# Patient Record
Sex: FEMALE | Race: WHITE | NOT HISPANIC OR LATINO | Employment: FULL TIME | URBAN - METROPOLITAN AREA
[De-identification: names, ages, dates, MRNs, and addresses within clinical notes are randomized per-mention and may not be internally consistent; named-entity substitution may affect disease eponyms.]

---

## 2017-06-08 ENCOUNTER — HOSPITAL ENCOUNTER (OUTPATIENT)
Facility: HOSPITAL | Age: 22
Setting detail: OBSERVATION
Discharge: HOME | End: 2017-06-08
Attending: OBSTETRICS & GYNECOLOGY | Admitting: OBSTETRICS & GYNECOLOGY

## 2017-06-08 ENCOUNTER — HOSPITAL ENCOUNTER (EMERGENCY)
Facility: HOSPITAL | Age: 22
Discharge: STILL ADMITTED OR ADMINISTRATIVE | End: 2017-06-08

## 2017-06-08 VITALS
RESPIRATION RATE: 16 BRPM | DIASTOLIC BLOOD PRESSURE: 62 MMHG | BODY MASS INDEX: 27.97 KG/M2 | OXYGEN SATURATION: 99 % | TEMPERATURE: 99 F | HEART RATE: 84 BPM | WEIGHT: 174 LBS | HEIGHT: 66 IN | SYSTOLIC BLOOD PRESSURE: 115 MMHG

## 2017-06-08 PROBLEM — Z34.90 PREGNANCY: Status: ACTIVE | Noted: 2017-06-08

## 2017-06-08 PROCEDURE — 59025 FETAL NON-STRESS TEST: CPT

## 2017-06-08 PROCEDURE — G0378 HOSPITAL OBSERVATION PER HR: HCPCS

## 2017-06-08 RX ORDER — PRENATAL VIT NO.126/IRON/FOLIC 28MG-0.8MG
TABLET ORAL DAILY
COMMUNITY
End: 2021-07-15

## 2017-06-08 NOTE — NON STRESS TEST
Carlyn Cali, a  at 29w4d with an BLANCA of 2017, by Other Basis, was seen at [facility]OR DELIV[facility] for a nonstress test.    Chief Complaint   Patient presents with   • Abdominal Cramping      at 29 weeks presents for lower abdominal cramping, started onmonday and has been on and off since then, rates as a 6/10, + fetal movement, denies lof and vaginal bleeding       Interpretation A  Nonstress Test Interpretation A: Reactive (17 [id number] : Susie Montemayor RN)

## 2017-07-17 ENCOUNTER — HOSPITAL ENCOUNTER (OUTPATIENT)
Facility: HOSPITAL | Age: 22
Setting detail: OBSERVATION
Discharge: HOME | End: 2017-07-17
Attending: OBSTETRICS & GYNECOLOGY | Admitting: OBSTETRICS & GYNECOLOGY

## 2017-07-17 VITALS
OXYGEN SATURATION: 97 % | BODY MASS INDEX: 30.37 KG/M2 | DIASTOLIC BLOOD PRESSURE: 54 MMHG | SYSTOLIC BLOOD PRESSURE: 112 MMHG | RESPIRATION RATE: 16 BRPM | HEART RATE: 84 BPM | TEMPERATURE: 98.8 F | WEIGHT: 189 LBS | HEIGHT: 66 IN

## 2017-07-17 PROBLEM — O99.891 BACK PAIN AFFECTING PREGNANCY IN THIRD TRIMESTER: Status: ACTIVE | Noted: 2017-07-17

## 2017-07-17 PROBLEM — R10.9 ABDOMINAL PAIN AFFECTING PREGNANCY, ANTEPARTUM: Status: ACTIVE | Noted: 2017-07-17

## 2017-07-17 PROBLEM — O26.899 ABDOMINAL PAIN AFFECTING PREGNANCY, ANTEPARTUM: Status: ACTIVE | Noted: 2017-07-17

## 2017-07-17 PROBLEM — M54.9 BACK PAIN AFFECTING PREGNANCY IN THIRD TRIMESTER: Status: ACTIVE | Noted: 2017-07-17

## 2017-07-17 LAB
ALBUMIN SERPL-MCNC: 3.5 G/DL (ref 3.5–5.2)
ALBUMIN/GLOB SERPL: 1 G/DL
ALP SERPL-CCNC: 159 U/L (ref 39–117)
ALT SERPL W P-5'-P-CCNC: 17 U/L (ref 1–33)
ANION GAP SERPL CALCULATED.3IONS-SCNC: 12.6 MMOL/L
AST SERPL-CCNC: 17 U/L (ref 1–32)
BACTERIA UR QL AUTO: ABNORMAL /HPF
BASOPHILS # BLD AUTO: 0.02 10*3/MM3 (ref 0–0.2)
BASOPHILS NFR BLD AUTO: 0.2 % (ref 0–1.5)
BILIRUB SERPL-MCNC: 0.3 MG/DL (ref 0.1–1.2)
BILIRUB UR QL STRIP: NEGATIVE
BUN BLD-MCNC: 6 MG/DL (ref 6–20)
BUN/CREAT SERPL: 15.4 (ref 7–25)
CALCIUM SPEC-SCNC: 8.9 MG/DL (ref 8.6–10.5)
CHLORIDE SERPL-SCNC: 104 MMOL/L (ref 98–107)
CLARITY UR: ABNORMAL
CO2 SERPL-SCNC: 22.4 MMOL/L (ref 22–29)
COLOR UR: YELLOW
CREAT BLD-MCNC: 0.39 MG/DL (ref 0.57–1)
DEPRECATED RDW RBC AUTO: 43 FL (ref 37–54)
EOSINOPHIL # BLD AUTO: 0.05 10*3/MM3 (ref 0–0.7)
EOSINOPHIL NFR BLD AUTO: 0.5 % (ref 0.3–6.2)
ERYTHROCYTE [DISTWIDTH] IN BLOOD BY AUTOMATED COUNT: 13.5 % (ref 11.7–13)
GFR SERPL CREATININE-BSD FRML MDRD: >150 ML/MIN/1.73
GLOBULIN UR ELPH-MCNC: 3.6 GM/DL
GLUCOSE BLD-MCNC: 101 MG/DL (ref 65–99)
GLUCOSE UR STRIP-MCNC: NEGATIVE MG/DL
HCT VFR BLD AUTO: 30.5 % (ref 35.6–45.5)
HGB BLD-MCNC: 10 G/DL (ref 11.9–15.5)
HGB UR QL STRIP.AUTO: NEGATIVE
HYALINE CASTS UR QL AUTO: ABNORMAL /LPF
IMM GRANULOCYTES # BLD: 0.15 10*3/MM3 (ref 0–0.03)
IMM GRANULOCYTES NFR BLD: 1.6 % (ref 0–0.5)
KETONES UR QL STRIP: NEGATIVE
LEUKOCYTE ESTERASE UR QL STRIP.AUTO: ABNORMAL
LYMPHOCYTES # BLD AUTO: 2.39 10*3/MM3 (ref 0.9–4.8)
LYMPHOCYTES NFR BLD AUTO: 24.9 % (ref 19.6–45.3)
MCH RBC QN AUTO: 28.4 PG (ref 26.9–32)
MCHC RBC AUTO-ENTMCNC: 32.8 G/DL (ref 32.4–36.3)
MCV RBC AUTO: 86.6 FL (ref 80.5–98.2)
MONOCYTES # BLD AUTO: 0.85 10*3/MM3 (ref 0.2–1.2)
MONOCYTES NFR BLD AUTO: 8.9 % (ref 5–12)
NEUTROPHILS # BLD AUTO: 6.12 10*3/MM3 (ref 1.9–8.1)
NEUTROPHILS NFR BLD AUTO: 63.9 % (ref 42.7–76)
NITRITE UR QL STRIP: NEGATIVE
PH UR STRIP.AUTO: 6.5 [PH] (ref 5–8)
PLATELET # BLD AUTO: 194 10*3/MM3 (ref 140–500)
PMV BLD AUTO: 11.9 FL (ref 6–12)
POTASSIUM BLD-SCNC: 3.5 MMOL/L (ref 3.5–5.2)
PROT SERPL-MCNC: 7.1 G/DL (ref 6–8.5)
PROT UR QL STRIP: NEGATIVE
RBC # BLD AUTO: 3.52 10*6/MM3 (ref 3.9–5.2)
RBC # UR: ABNORMAL /HPF
REF LAB TEST METHOD: ABNORMAL
SODIUM BLD-SCNC: 139 MMOL/L (ref 136–145)
SP GR UR STRIP: 1.01 (ref 1–1.03)
SQUAMOUS #/AREA URNS HPF: ABNORMAL /HPF
UROBILINOGEN UR QL STRIP: ABNORMAL
WBC NRBC COR # BLD: 9.58 10*3/MM3 (ref 4.5–10.7)
WBC UR QL AUTO: ABNORMAL /HPF

## 2017-07-17 PROCEDURE — 80053 COMPREHEN METABOLIC PANEL: CPT | Performed by: OBSTETRICS & GYNECOLOGY

## 2017-07-17 PROCEDURE — 87186 SC STD MICRODIL/AGAR DIL: CPT | Performed by: OBSTETRICS & GYNECOLOGY

## 2017-07-17 PROCEDURE — 59025 FETAL NON-STRESS TEST: CPT | Performed by: OBSTETRICS & GYNECOLOGY

## 2017-07-17 PROCEDURE — 99203 OFFICE O/P NEW LOW 30 MIN: CPT | Performed by: OBSTETRICS & GYNECOLOGY

## 2017-07-17 PROCEDURE — 59025 FETAL NON-STRESS TEST: CPT

## 2017-07-17 PROCEDURE — G0378 HOSPITAL OBSERVATION PER HR: HCPCS

## 2017-07-17 PROCEDURE — 85025 COMPLETE CBC W/AUTO DIFF WBC: CPT | Performed by: OBSTETRICS & GYNECOLOGY

## 2017-07-17 PROCEDURE — 81001 URINALYSIS AUTO W/SCOPE: CPT | Performed by: OBSTETRICS & GYNECOLOGY

## 2017-07-17 PROCEDURE — 87086 URINE CULTURE/COLONY COUNT: CPT | Performed by: OBSTETRICS & GYNECOLOGY

## 2017-07-17 NOTE — NURSING NOTE
Patient provided discharge instructions, and verbalizes understanding. Aware to return to triage for changes or concerns. Encouraged to keep prenatal appointments. Patient appears comfortable and ambulated off unit.

## 2017-07-17 NOTE — H&P
[facility]  Obstetric History and Physical    Chief Complaint   Patient presents with   • Contractions     Pt states that she has been having abdominal pain and back pain for three days. Pt sees Dr. Amira Jansen in [place] and plans to delivery there.       Subjective     Patient is a 21 y.o. female  currently at 35w1d, who presents with 3 day h/o lower abdominal pain, back pain and occasional contractions (a few per hour). Has been working 10 hour days on her feet. Prenatal care in [place], [place], but recently moved here.    Her prenatal care is benign.  Her previous obstetric/gynecological history is noted for is non-contributory.    The following portions of the patients history were reviewed and updated as appropriate: current medications, allergies, past medical history, past surgical history, past family history, past social history and problem list .       Prenatal Information:  Prenatal Results         1st Trimester Ref. Range Date Time   CBC with auto diff       Rubella IgG       Hepatitis B SAg       RPR       ABO       Rh       Anibody Screen       HIV       Varicella IgG       Urinalysis with microscopy       Urine Culture       GC/Chlamydia/TV       ThinPrep/Pap       2nd and 3rd Trimester Ref. Range Date Time   Hemoglobin / Hematocrit  30.5 % (L) 35.6 - 45.5 % 17 1702   Hemoglobin  10.0 g/dL (L) 11.9 - 15.5 g/dL 17 1702   Group B Strep Culture       Glucose Challenge Test 1 Hr       Glucose Fasting       Glucose 1 Hr       Glucose 2 Hr       Glucose 3 Hr       Pre-eclampsia Panel       Risk Screening Ref. Range Date Time   Fetal Fibronectin       Amnisure       Hepatitis C Antibody       Hemoglobin electrophoresis       Cystic Fibrosis       Hemoglobin A1C       MSAFP - 4       NIPT       AFP       Parvovirus IgG       Parvovirus IgM       POCT - glucose       Hieu-Sac       24 Hour urine - Total protein       24 Hour urine - Creatinine clearance       Urinalysis with microscopy        Urine Culture       Drug Screening Ref. Range Date Time   Amphetamine Screen       Barbiturate Screen       Benzodiazepine Screen       Methadone Screen       Phencyclidine Screen       Opiates Screen       THC Screen       Cocaine Screen       Propoxyphene Screen       Buprenorphine Screen       Methamphetamine Screen       Oxycodone Screen       Tryicyclic Antidepressants Screen              Legend: ^: Historical            View all results for this pregnancy             Past OB History:     Obstetric History       T0      TAB0   SAB0   E0   M0   L0       # Outcome Date GA Lbr Jaron/2nd Weight Sex Delivery Anes PTL Lv   1 Current                   Past Medical History: History reviewed. No pertinent past medical history.   Past Surgical History Past Surgical History:   Procedure Laterality Date   • TONSILLECTOMY        Family History: History reviewed. No pertinent family history.   Social History:  reports that she has never smoked. She has never used smokeless tobacco.   reports that she does not drink alcohol.   reports that she does not use illicit drugs.        General ROS: The following systems were reviewed and negative;  constitution, eyes, ENT, cardiovascular, genitourinary, integument, hematologic / lymphatic, neurological, behavioral/psych, endocrine and allergies / immunologic    Objective       Vital Signs Range for the last 24 hours  Temperature: Temp:  [98.8 °F (37.1 °C)] 98.8 °F (37.1 °C)   Temp Source: Temp src: Oral   BP: BP: (111-152)/(66-80) 127/72   Pulse: Heart Rate:  [82-98] 86   Respirations: Resp:  [16] 16   SPO2: SpO2:  [97 %] 97 %   O2 Amount (l/min):     O2 Devices O2 Device: room air   Weight: Weight:  [189 lb (85.7 kg)] 189 lb (85.7 kg)     Physical Examination: General appearance - alert, well appearing, and in no distress  Mental status - alert, oriented to person, place, and time  Abdomen - soft, nontender, gravid  Back exam - full range of motion, no tenderness,  palpable spasm or pain on motion, no CVA tenderness  Neurological - alert, oriented, normal speech, no focal findings or movement disorder noted  Musculoskeletal - no joint tenderness, deformity or swelling  Extremities - peripheral pulses normal, no pedal edema, no clubbing or cyanosis  Skin - normal coloration and turgor, no rashes, no suspicious skin lesions noted    Presentation:    Cervix: Exam by: Method: sterile exam per RN   Dilation: Dilation: 0   Effacement: Cervical Effacement: 40%   Station: Station: -3    NST reactive, category 1 tracing w/ rare contractions    Fetal Heart Rate Assessment   Method: Fetal HR Assessment Method: external   Beats/min: Fetal HR (Beats/Min): 130   Baseline: Fetal HR Baseline: normal range (110-160 bpm)   Varibility: Fetal HR Variability: moderate (amplitude range 6 to 25 bpm)   Accels: Fetal HR Accelerations: greater than/equal to 15 bpm, lasting at least 15 seconds   Decels: Fetal HR Decelerations: absent   Tracing Category:       Uterine Assessment   Method: Method: TOCO (external toco transducer)   Frequency (min): Contraction Frequency (min): x1   Ctx Count in 10 min:     Duration: Contraction Duration (sec): 40   Intensity: Contraction Intensity: mild by palpation   Intensity by IUPC:     Resting Tone: Uterine Resting Tone: soft by palpation   Resting Tone by IUPC:     [place] Units:       Laboratory Results:   Radiology Review:   Other Studies:     Assessment/Plan     Active Problems:    Pregnancy    Abdominal pain affecting pregnancy, antepartum    Back pain affecting pregnancy in third trimester        Assessment/Plan  1.  Intrauterine pregnancy at 35w1d weeks gestation with reassuring fetal status.    2.  Mild contractions- recommend pt start maternity leave so can rest, and recommend increased water intake  3. CBC,CMP and UA ordered, if ok, will send home  4. Offered apt w/ our practice this week w/ US growth if desires to transfer care locally and deliver at  [facility]  5. Abdominal pain/back pain- may be r/t contractions, musculoskeletal strain or advancing pregnancy, may consider PT referral if pain continues when off work      Tena Bess MD  7/17/2017  6:00 PM

## 2017-07-17 NOTE — NON STRESS TEST
Carlyn Cali, a  at 35w1d with an BLANCA of 2017, by Other Basis, was seen at [facility] for a nonstress test.    Chief Complaint   Patient presents with   • Contractions     Pt states that she has been having abdominal pain and back pain for three days. Pt sees Dr. Amira Jansen in [place] and plans to delivery there.       Interpretation A  Nonstress Test Interpretation A: Reactive (17 0430 : Susie Montemayor RN)

## 2017-07-20 LAB — BACTERIA SPEC AEROBE CULT: ABNORMAL

## 2020-11-05 LAB
EXTERNAL HEPATITIS B SURFACE ANTIGEN: NEGATIVE
EXTERNAL HEPATITIS C AB: NORMAL
EXTERNAL RUBELLA QUALITATIVE: NORMAL
EXTERNAL SYPHILIS RPR SCREEN: NORMAL
HIV1 P24 AG SERPL QL IA: NORMAL

## 2021-01-29 ENCOUNTER — TELEPHONE (OUTPATIENT)
Dept: OBSTETRICS AND GYNECOLOGY | Facility: CLINIC | Age: 26
End: 2021-01-29

## 2021-01-29 NOTE — TELEPHONE ENCOUNTER
[facility].  She is approx 18 wks gestation and wishes to transfer to our office.  Would you be willing to take on pt's care?    Pt # [phone]

## 2021-01-29 NOTE — TELEPHONE ENCOUNTER
L/m for pt to call to schedule New Ob appt w/Dr. Leyva.  Approx 18 wks, transfer of care from [facility].  Records filed to chart.     Pt # [phone]

## 2021-02-25 ENCOUNTER — INITIAL PRENATAL (OUTPATIENT)
Dept: OBSTETRICS AND GYNECOLOGY | Facility: CLINIC | Age: 26
End: 2021-02-25

## 2021-02-25 VITALS — WEIGHT: 179 LBS | BODY MASS INDEX: 28.89 KG/M2 | DIASTOLIC BLOOD PRESSURE: 70 MMHG | SYSTOLIC BLOOD PRESSURE: 114 MMHG

## 2021-02-25 DIAGNOSIS — Z98.891 HISTORY OF C-SECTION: ICD-10-CM

## 2021-02-25 DIAGNOSIS — O23.40 URINARY TRACT INFECTION IN MOTHER DURING PREGNANCY, ANTEPARTUM: ICD-10-CM

## 2021-02-25 DIAGNOSIS — Z3A.22 22 WEEKS GESTATION OF PREGNANCY: Primary | ICD-10-CM

## 2021-02-25 LAB
GLUCOSE UR STRIP-MCNC: NEGATIVE MG/DL
PROT UR STRIP-MCNC: NEGATIVE MG/DL

## 2021-02-25 PROCEDURE — 99204 OFFICE O/P NEW MOD 45 MIN: CPT | Performed by: STUDENT IN AN ORGANIZED HEALTH CARE EDUCATION/TRAINING PROGRAM

## 2021-02-25 RX ORDER — NITROFURANTOIN 25; 75 MG/1; MG/1
100 CAPSULE ORAL 2 TIMES DAILY
Qty: 14 CAPSULE | Refills: 0 | Status: SHIPPED | OUTPATIENT
Start: 2021-02-25 | End: 2021-03-04

## 2021-02-25 NOTE — PROGRESS NOTES
Initial OB Visit    Chief Complaint   Patient presents with   • Initial Prenatal Visit        Carlyn Cali is being seen today for her first obstetrical visit.  She is a 25 y.o.   at 22w6d by LMP with BLANCA 21. She has been receiving care at [facility] and is transferring care to [place]. She reports having a normal anatomy scan last month.     FOB: Elia Blankenship   This is not a planned pregnancy.   OB History    Para Term  AB Living   2 1 1     1   SAB TAB Ectopic Molar Multiple Live Births                    # Outcome Date GA Lbr Jaron/2nd Weight Sex Delivery Anes PTL Lv   2 Current            1 Term 17 40w2d  4224 g (9 lb 5 oz) F CS-LTranv          Current obstetric complaints: She reports that she is still having nausea and vomiting but able to tolerate a regular diet. She denies vaginal bleeding, leakage of fluid, or contractions. She reports active fetal movement.   Prior obstetric issues, potential pregnancy concerns: Denies history of gestational diabetes, gestational hypertension, postpartum hemorrhage.    G1- Primary low transverse  section at 39w4d for fetal macrosomia   G2- Current   Family history of genetic issues (includes FOB): denies   Prior infections concerning in pregnancy (Rash, fever since LMP): denies   Varicella Hx:Reports having been vaccinated.   Prior genetic testing: low risk cell free DNA  History of abnormal pap smears: denies; last pap smear: normal per patient in 2021  History of STIs: Trichomonas- treated before pregnancy. History of HSV in self or partner? Denies   Prepregnancy weight: 170 lbs     Past Medical History:   Diagnosis Date   • Anemia        Past Surgical History:   Procedure Laterality Date   •  SECTION     • TONSILLECTOMY           Current Outpatient Medications:   •  Ferrous Sulfate (IRON PO), Take  by mouth., Disp: , Rfl:   •  Prenatal Vit-Fe Fumarate-FA (PRENATAL, CLASSIC, VITAMIN) 28-0.8 MG  tablet tablet, Take  by mouth Daily., Disp: , Rfl:   •  nitrofurantoin, macrocrystal-monohydrate, (Macrobid) 100 MG capsule, Take 1 capsule by mouth 2 (Two) Times a Day for 7 days., Disp: 14 capsule, Rfl: 0    No Known Allergies    Social History     Socioeconomic History   • Marital status: Single     Spouse name: Not on file   • Number of children: Not on file   • Years of education: Not on file   • Highest education level: Not on file   Tobacco Use   • Smoking status: Never Smoker   • Smokeless tobacco: Never Used   Substance and Sexual Activity   • Alcohol use: No   • Drug use: No   • Sexual activity: Yes     Partners: Male       History reviewed. No pertinent family history.    Review of systems     Constitutional: negative for chills, fevers and for fatigue  Eyes: negative  Ears, nose, mouth, throat, and face: negative for hearing loss and nasal congestion  Respiratory: negative for asthma and wheezing  Cardiovascular: negative for chest pain and dyspnea  Gastrointestinal: negative for dyspepsia, dysphagia abdominal pain  Genitourinary:negative for urinary incontinence  Integument/breast: negative for breast lump  Hematologic/lymphatic: negative for bleeding  Musculoskeletal:negative for aches  Neurological: negative for numbness/tingling  Behavioral/Psych: negative for anhedonia  Allergic/Immunologic: negative for rash, allergy     Objective    /70   Wt 81.2 kg (179 lb)   LMP 09/18/2020   BMI 28.89 kg/m²       General Appearance:    Alert, cooperative, in no acute distress   Head:    Normocephalic, without obvious abnormality, atraumatic   Eyes:            Lids and lashes normal, conjunctivae and sclerae normal, no   icterus, no pallor, corneas clear   Ears:    Ears appear intact with no abnormalities noted   Neck:   No adenopathy, supple, trachea midline   Back:     No kyphosis present, no scoliosis present                      Lungs:     No increased work of breathing, regular respirations      Heart:    Regular rhythm and normal rate   Breast Exam:    No masses, No nipple discharge   Abdomen:    Gravid, non-tender, soft    Genitalia:    Vulva - BUS-WNL, NEFG    Vagina - normal vaginal mucosa    Cervix -  closed     Uterus - Consistent with 24 weeks    Adnexa - No masses, NT     Extremities:   Moves all extremities well, no edema, no cyanosis, no              redness   Pulses:   Pulses palpable and equal bilaterally   Skin:   No bleeding, bruising or rash   Lymph nodes:   No palpable adenopathy   Neurologic:   Sensation intact, A&O times 3      OB labs: 20- MBT O positive, ABS negative, CBC 7.7\12.7/297, Rubella Immune, HIV NR, Hepatitis B Negative, Hepatitis C negative, RPR NR, Gonorrhea neg, Chlamydia neg, Trichomonas positive, UDS negative, Cell free DNA low risk     Assessment  1. Pregnancy at 22w6d   2. Transfer of care  3. UTI   4. History of trichomonas in pregnancy, negative URIEL  5. History of UTI in pregnancy in first trimester   6. History of C/S x 1 for fetal macrosomia, desires repeat     Plan  Reviewed prenatal record from [facility] and obtained previous operative report to confirm low transverse  section.   Will plan for fetal growth ultrasound between 32-36 weeks given history of fetal macrosomia in previous pregnancy.   Given positive nitrites on UA today, will treat patient for UTI with macrobid 100 mg BID x 7 days and send urine culture.   Will obtain CBC, Glucola at next visit.   Patient is on Prenatal vitamins.  Problem list reviewed and updated.  Reviewed routine prenatal care with the office to include but not limited to:   Reviewed nature of practice and hospital.  Reviewed recommended follow up, importance of compliance with care. We reviewed testing in pregnancy including HIV testing and urine drug screen.    Counseled on limitations of ultrasound in pregnancy in detecting aneuploidy/fetal anomalies.     We reviewed that at this time, her BMI is classified as BMI  25.0-29.9        Classification: overweight.  We reviewed that in pregnancy, her recommended weight gain is 15-25 lbs. In the second and third trimester, the increased demand is approximately 350 and 450 calories respectively.    All questions answered.   Return in about 4 weeks (around 3/25/2021) for prenatal visit .      Pauly Leyva MD

## 2021-02-26 PROBLEM — A59.9 TRICHOMONAS VAGINALIS INFECTION: Status: ACTIVE | Noted: 2021-02-26

## 2021-02-26 PROBLEM — Z98.891 HISTORY OF CESAREAN DELIVERY: Status: ACTIVE | Noted: 2021-02-26

## 2021-02-26 PROBLEM — O23.40 UTI (URINARY TRACT INFECTION) DURING PREGNANCY: Status: ACTIVE | Noted: 2021-02-26

## 2021-02-28 LAB
BACTERIA UR CULT: ABNORMAL
BACTERIA UR CULT: ABNORMAL
OTHER ANTIBIOTIC SUSC ISLT: ABNORMAL

## 2021-03-01 ENCOUNTER — TELEPHONE (OUTPATIENT)
Dept: OBSTETRICS AND GYNECOLOGY | Facility: CLINIC | Age: 26
End: 2021-03-01

## 2021-03-01 NOTE — TELEPHONE ENCOUNTER
Called patient regarding positive urine culture result for E.coli UTI. Patient indicated understanding and is taking Macrobid for treatment following last appointment. Advised that this should treat and she is to contact the office with infection concerns. All questions answered.     Pauly Leyva MD  3/1/2021  12:52 EST

## 2021-03-03 ENCOUNTER — TELEPHONE (OUTPATIENT)
Dept: OBSTETRICS AND GYNECOLOGY | Facility: CLINIC | Age: 26
End: 2021-03-03

## 2021-03-03 NOTE — TELEPHONE ENCOUNTER
Good Morning,   Patient called and called into work because her ankles were swollen and and she had gotten sick. She said she stands for 10 hours a day and called in today. She wanted to know if she could get a work note?   Please Advise,  Thanks Jessika

## 2021-04-01 ENCOUNTER — ROUTINE PRENATAL (OUTPATIENT)
Dept: OBSTETRICS AND GYNECOLOGY | Facility: CLINIC | Age: 26
End: 2021-04-01

## 2021-04-01 VITALS — WEIGHT: 187 LBS | BODY MASS INDEX: 30.18 KG/M2 | SYSTOLIC BLOOD PRESSURE: 131 MMHG | DIASTOLIC BLOOD PRESSURE: 77 MMHG

## 2021-04-01 DIAGNOSIS — R39.89 POSSIBLE URINARY TRACT INFECTION: ICD-10-CM

## 2021-04-01 DIAGNOSIS — Z3A.27 27 WEEKS GESTATION OF PREGNANCY: Primary | ICD-10-CM

## 2021-04-01 DIAGNOSIS — Z98.891 HISTORY OF C-SECTION: ICD-10-CM

## 2021-04-01 LAB
GLUCOSE UR STRIP-MCNC: NEGATIVE MG/DL
PROT UR STRIP-MCNC: NEGATIVE MG/DL

## 2021-04-01 PROCEDURE — 99213 OFFICE O/P EST LOW 20 MIN: CPT | Performed by: STUDENT IN AN ORGANIZED HEALTH CARE EDUCATION/TRAINING PROGRAM

## 2021-04-01 NOTE — PROGRESS NOTES
Chief Complaint   Patient presents with   • Routine Prenatal Visit      Carlyn Cali is a 25 y.o.  at 27w6d who presents for routine prenatal visit. She has no complaints today and states that she is just tired. She denies vaginal bleeding, cramping, contractions, LOF. She has active fetal movement. She denies fever, chills, dysuria or back pain.     /77   Wt 84.8 kg (187 lb)   LMP 2020   BMI 30.18 kg/m²    Gen: well appearing, NAd  Abd: gravid, nontender  Back: no CVA tenderness   See OB Flowsheet    ASSESSMENT:   1. IUP at 27w6d   2. Possible UTI, h/o UTI   3. History of C/S x 1 for fetal macrosomia     PLAN:  Problem list reviewed and updated.  Repeat urine culture collected today with concerns for UTI on UA. Will prescribe keflex 500 mg QID x 7 days for treatment of UTI. Reviewed previous urine culture which was E. Coli and was pan-sensitive.   We discussed TDaP vaccination and the patient would like to consider at next visit.   Second trimester precautions reviewed including  labor precautions, anticipated fetal movements.   Return in about 2 weeks (around 4/15/2021) for prenatal visit .    Patient Active Problem List    Diagnosis Date Noted   • UTI (urinary tract infection) during pregnancy 2021   • Trichomonas vaginalis infection 2021   • History of  delivery 2021   • Abdominal pain affecting pregnancy, antepartum 2017   • Back pain affecting pregnancy in third trimester 2017   • Pregnancy 2017       Orders Placed This Encounter   Procedures   • Urine Culture - Urine, Urine, Clean Catch   • POC Urinalysis Dipstick     This is an external result entered through the Results Console.     Pauly Leyva MD

## 2021-04-02 ENCOUNTER — TELEPHONE (OUTPATIENT)
Dept: OBSTETRICS AND GYNECOLOGY | Facility: CLINIC | Age: 26
End: 2021-04-02

## 2021-04-02 RX ORDER — CEPHALEXIN 500 MG/1
500 CAPSULE ORAL 4 TIMES DAILY
Qty: 28 CAPSULE | Refills: 0 | Status: SHIPPED | OUTPATIENT
Start: 2021-04-02 | End: 2021-04-09

## 2021-04-02 NOTE — TELEPHONE ENCOUNTER
Pt states she is returning a call from today.  No msg's in chart.  Do you have her urine culture results, or other information to relay?    Pt # [phone]

## 2021-04-04 LAB
BACTERIA UR CULT: ABNORMAL
BACTERIA UR CULT: ABNORMAL
OTHER ANTIBIOTIC SUSC ISLT: ABNORMAL

## 2021-04-06 ENCOUNTER — TELEPHONE (OUTPATIENT)
Dept: OBSTETRICS AND GYNECOLOGY | Facility: CLINIC | Age: 26
End: 2021-04-06

## 2021-04-06 NOTE — TELEPHONE ENCOUNTER
Patient is aware of results and has no additional questions.      Original message from provider - Please call and let her know that she passed her diabetes screening and her blood counts were normal. Thanks!

## 2021-04-29 ENCOUNTER — ROUTINE PRENATAL (OUTPATIENT)
Dept: OBSTETRICS AND GYNECOLOGY | Facility: CLINIC | Age: 26
End: 2021-04-29

## 2021-04-29 VITALS — BODY MASS INDEX: 30.99 KG/M2 | WEIGHT: 192 LBS | SYSTOLIC BLOOD PRESSURE: 132 MMHG | DIASTOLIC BLOOD PRESSURE: 72 MMHG

## 2021-04-29 DIAGNOSIS — Z3A.31 31 WEEKS GESTATION OF PREGNANCY: Primary | ICD-10-CM

## 2021-04-29 DIAGNOSIS — Z87.440 HISTORY OF UTI: ICD-10-CM

## 2021-04-29 DIAGNOSIS — Z30.09 UNWANTED FERTILITY: ICD-10-CM

## 2021-04-29 DIAGNOSIS — Z34.90 PREGNANCY, UNSPECIFIED GESTATIONAL AGE: ICD-10-CM

## 2021-04-29 DIAGNOSIS — O26.843 UTERINE SIZE DATE DISCREPANCY PREGNANCY, THIRD TRIMESTER: ICD-10-CM

## 2021-04-29 DIAGNOSIS — Z98.891 HISTORY OF CESAREAN DELIVERY: ICD-10-CM

## 2021-04-29 PROCEDURE — 0502F SUBSEQUENT PRENATAL CARE: CPT | Performed by: STUDENT IN AN ORGANIZED HEALTH CARE EDUCATION/TRAINING PROGRAM

## 2021-04-29 NOTE — PROGRESS NOTES
Chief Complaint   Patient presents with   • Routine Prenatal Visit      Carlyn Cali is a 25 y.o.  at 31w6d who presents for routine prenatal visit. She reports doing well since her last visit just feels more uncomfortable and tired. Denies vaginal bleeding, cramping, contractions, LOF. Reports active fetal movement. Denies dysuria, fever, chills, back pain. Patient reports that she would like her tubes tied at the time of her c/s.     /72   Wt 87.1 kg (192 lb)   LMP 2020   BMI 30.99 kg/m²    Gen: well appearing, NAD   Abd: gravid, nontender  Back: no CVA tenderness   See OB Flowsheet    ASSESSMENT:   1. IUP at 31w6d   2. History of UTI on 21   3. History of C/s x 1, for fetal macrosomia   4. Unwanted fertility   5. Uterine size less than dates     PLAN:  Problem list reviewed and updated.   Patient desires repeat  section and would like a tubal ligation. Counseled on risks/benefits of bilateral tubal ligation.  She was counseled that this should be considered a permanent procedure.  Although there are surgeries to reverse this, there not 100% successful.  She is adamant that she desires no further childbearing.  We discussed that bilateral tubal ligations usually do not change menstrual cycles, so some women are on hormonal therapy to control bleeding or regularly periods.  We discussed that it also does not prevent sexually transmitted infections and so condoms are recommended to prevent transmission of these diseases.  We discussed other options for contraception including LARCs.  Patient was counseled that there is a risk of failure and if the tubal ligation were to fail, there is an increased risk of ectopic pregnancy.  She was counseled that she missed a period she should take a pregnancy test and if positive be seen by a physician immediately.  We reviewed the risk of regret and desire for future childbearing.  We discussed that this risk is higher in women who have fewer  children or or at a younger age.  Patient had all questions answered at the end of this discussion. Will plan to perform at time of  section. Plan for  section on 21 at 39w0d.   Patient to provide urine sample tomorrow for test of cure following UTI at previous visit for which she completed treatment.   Will obtain growth ultrasound at next visit given uterine size less than dates.  Third trimester precautions reviewed including labor signs, monitoring fetal movements.  Return in about 2 weeks (around 2021) for prenatal visit and growth ultrasound .    Patient Active Problem List    Diagnosis Date Noted   • UTI (urinary tract infection) during pregnancy 2021   • Trichomonas vaginalis infection 2021   • History of  delivery 2021   • Abdominal pain affecting pregnancy, antepartum 2017   • Back pain affecting pregnancy in third trimester 2017   • Pregnancy 2017       Orders Placed This Encounter   Procedures   • Urine Culture - Urine, Urine, Clean Catch     Order Specific Question:   Release to patient     Answer:   Immediate   • US ob follow up transabdominal approach     Standing Status:   Future     Standing Expiration Date:   2022     Order Specific Question:   Reason for Exam:     Answer:   size less than dates     Order Specific Question:   Release to patient     Answer:   Immediate     Pauly Leyva MD

## 2021-04-30 DIAGNOSIS — Z87.440 HISTORY OF UTI: Primary | ICD-10-CM

## 2021-04-30 PROBLEM — Z30.09 UNWANTED FERTILITY: Status: ACTIVE | Noted: 2021-04-30

## 2021-05-13 ENCOUNTER — ROUTINE PRENATAL (OUTPATIENT)
Dept: OBSTETRICS AND GYNECOLOGY | Facility: CLINIC | Age: 26
End: 2021-05-13

## 2021-05-13 VITALS — DIASTOLIC BLOOD PRESSURE: 63 MMHG | BODY MASS INDEX: 31.15 KG/M2 | WEIGHT: 193 LBS | SYSTOLIC BLOOD PRESSURE: 116 MMHG

## 2021-05-13 DIAGNOSIS — Z30.09 UNWANTED FERTILITY: ICD-10-CM

## 2021-05-13 DIAGNOSIS — Z3A.33 33 WEEKS GESTATION OF PREGNANCY: Primary | ICD-10-CM

## 2021-05-13 DIAGNOSIS — O23.43 URINARY TRACT INFECTION IN MOTHER DURING THIRD TRIMESTER OF PREGNANCY: ICD-10-CM

## 2021-05-13 DIAGNOSIS — Z98.891 HISTORY OF C-SECTION: ICD-10-CM

## 2021-05-13 LAB
GLUCOSE UR STRIP-MCNC: NEGATIVE MG/DL
PROT UR STRIP-MCNC: NEGATIVE MG/DL

## 2021-05-13 PROCEDURE — 0502F SUBSEQUENT PRENATAL CARE: CPT | Performed by: STUDENT IN AN ORGANIZED HEALTH CARE EDUCATION/TRAINING PROGRAM

## 2021-05-13 RX ORDER — NITROFURANTOIN 25; 75 MG/1; MG/1
100 CAPSULE ORAL 2 TIMES DAILY
Qty: 40 CAPSULE | Refills: 1 | Status: SHIPPED | OUTPATIENT
Start: 2021-05-13 | End: 2021-05-20

## 2021-05-16 LAB
BACTERIA UR CULT: ABNORMAL
BACTERIA UR CULT: ABNORMAL
OTHER ANTIBIOTIC SUSC ISLT: ABNORMAL

## 2021-05-21 ENCOUNTER — TELEPHONE (OUTPATIENT)
Dept: OBSTETRICS AND GYNECOLOGY | Facility: CLINIC | Age: 26
End: 2021-05-21

## 2021-05-21 NOTE — TELEPHONE ENCOUNTER
Contacted patient about test results. Patient is aware and has no additional questions.        Original message from provider - Please call and let her know that her urine culture did show that she still had an E.coli urinary tract infection and the antibiotics that she is taking should help clear her infection. Thanks!

## 2021-05-28 ENCOUNTER — ROUTINE PRENATAL (OUTPATIENT)
Dept: OBSTETRICS AND GYNECOLOGY | Facility: CLINIC | Age: 26
End: 2021-05-28

## 2021-05-28 VITALS — SYSTOLIC BLOOD PRESSURE: 130 MMHG | DIASTOLIC BLOOD PRESSURE: 70 MMHG | WEIGHT: 198 LBS | BODY MASS INDEX: 31.96 KG/M2

## 2021-05-28 DIAGNOSIS — Z34.80 SUPERVISION OF OTHER NORMAL PREGNANCY, ANTEPARTUM: ICD-10-CM

## 2021-05-28 DIAGNOSIS — Z98.891 HISTORY OF C-SECTION: ICD-10-CM

## 2021-05-28 DIAGNOSIS — Z30.09 UNWANTED FERTILITY: ICD-10-CM

## 2021-05-28 DIAGNOSIS — Z87.440 HISTORY OF UTI: ICD-10-CM

## 2021-05-28 DIAGNOSIS — Z3A.36 36 WEEKS GESTATION OF PREGNANCY: Primary | ICD-10-CM

## 2021-05-28 PROCEDURE — 0502F SUBSEQUENT PRENATAL CARE: CPT | Performed by: STUDENT IN AN ORGANIZED HEALTH CARE EDUCATION/TRAINING PROGRAM

## 2021-05-30 LAB
BACTERIA UR CULT: NORMAL
BACTERIA UR CULT: NORMAL

## 2021-06-01 LAB — B-HEM STREP SPEC QL CULT: NEGATIVE

## 2021-06-04 ENCOUNTER — ROUTINE PRENATAL (OUTPATIENT)
Dept: OBSTETRICS AND GYNECOLOGY | Facility: CLINIC | Age: 26
End: 2021-06-04

## 2021-06-04 VITALS — SYSTOLIC BLOOD PRESSURE: 122 MMHG | DIASTOLIC BLOOD PRESSURE: 76 MMHG | BODY MASS INDEX: 31.96 KG/M2 | WEIGHT: 198 LBS

## 2021-06-04 DIAGNOSIS — Z3A.37 37 WEEKS GESTATION OF PREGNANCY: Primary | ICD-10-CM

## 2021-06-04 DIAGNOSIS — R42 LIGHT HEADEDNESS: ICD-10-CM

## 2021-06-04 DIAGNOSIS — Z98.891 HISTORY OF C-SECTION: ICD-10-CM

## 2021-06-04 DIAGNOSIS — Z30.09 UNWANTED FERTILITY: ICD-10-CM

## 2021-06-04 DIAGNOSIS — Z87.440 HISTORY OF UTI: ICD-10-CM

## 2021-06-04 LAB
GLUCOSE UR STRIP-MCNC: NEGATIVE MG/DL
PROT UR STRIP-MCNC: ABNORMAL MG/DL

## 2021-06-04 PROCEDURE — 59426 ANTEPARTUM CARE ONLY: CPT | Performed by: STUDENT IN AN ORGANIZED HEALTH CARE EDUCATION/TRAINING PROGRAM

## 2021-06-04 NOTE — PROGRESS NOTES
Chief Complaint   Patient presents with   • Routine Prenatal Visit      Carlyn Cali is a 25 y.o.  at 37w0d who presents for routine prenatal visit. She reports feeling some what light headed and attributes it to anemia. She is taking one tablet of iron daily. Denies vaginal bleeding, cramping, contractions, LOF. She reports active fetal movement. She is compliant with suppression therapy for recurrent UTIs. She denies fever, chills, back pain, abdominal pain or dysuria.     /76   Wt 89.8 kg (198 lb)   LMP 2020   BMI 31.96 kg/m²    Gen: well appearing, NAD   Abd: gravid, nontender  Back: no CVA tenderness   SVE: 0/0/-1   See OB Flowsheet    ASSESSMENT:   1. IUP at 37w0d   2. History of recurrent UTIs- on suppression with Macrobid   3. History of C/s x 1  4. Unwanted fertility  5. Light headedness     PLAN:  Problem list reviewed and updated.   Scheduled for repeat  section and tubal ligation on 21.   Reviewed negative test of cure for UTI during last visit. Continue suppression therapy with macrobid nightly.   We discussed increasing Fe supplementation from daily to BID and decreasing if she becomes constipated. Patient advised to contact office for worsening light headedness that I suspect is related to pregnancy.   Third trimester precautions reviewed including labor signs, monitoring fetal movements.   Return in about 1 week (around 2021) for prenatal visit with Carlyn Flood, NP. .    Patient Active Problem List    Diagnosis Date Noted   • Unwanted fertility 2021     Note Last Updated: 2021     Added automatically from request for surgery [id number]     • UTI (urinary tract infection) during pregnancy 2021   • Trichomonas vaginalis infection 2021   • History of  delivery 2021   • Abdominal pain affecting pregnancy, antepartum 2017   • Back pain affecting pregnancy in third trimester 2017   • Pregnancy 2017       Orders  Placed This Encounter   Procedures   • POC Urinalysis Dipstick     This is an external result entered through the Results Console.     Order Specific Question:   Release to patient     Answer:   Immediate     Pauly Leyva MD

## 2021-06-11 ENCOUNTER — ROUTINE PRENATAL (OUTPATIENT)
Dept: OBSTETRICS AND GYNECOLOGY | Facility: CLINIC | Age: 26
End: 2021-06-11

## 2021-06-11 VITALS — DIASTOLIC BLOOD PRESSURE: 73 MMHG | BODY MASS INDEX: 32.28 KG/M2 | WEIGHT: 200 LBS | SYSTOLIC BLOOD PRESSURE: 131 MMHG

## 2021-06-11 DIAGNOSIS — Z34.83 ENCOUNTER FOR SUPERVISION OF OTHER NORMAL PREGNANCY IN THIRD TRIMESTER: Primary | ICD-10-CM

## 2021-06-11 DIAGNOSIS — O34.219 PREVIOUS CESAREAN DELIVERY, ANTEPARTUM: ICD-10-CM

## 2021-06-11 LAB
GLUCOSE UR STRIP-MCNC: NEGATIVE MG/DL
PROT UR STRIP-MCNC: NEGATIVE MG/DL

## 2021-06-11 PROCEDURE — 0502F SUBSEQUENT PRENATAL CARE: CPT | Performed by: OBSTETRICS & GYNECOLOGY

## 2021-06-11 RX ORDER — NITROFURANTOIN 25; 75 MG/1; MG/1
100 CAPSULE ORAL 2 TIMES DAILY
COMMUNITY
End: 2021-06-21 | Stop reason: HOSPADM

## 2021-06-11 NOTE — PROGRESS NOTES
Ob follow up    Carlyn Cali is a 25 y.o.  38w0d patient being seen today for her obstetrical visit. Patient reports no complaints. Fetal movement: normal.    Her prenatal care is complicated by (and status) : Prior        ROS -   Fetal Movement good   Vaginal bleeding none   Cramping/Contractions none      /73   Wt 90.7 kg (200 lb)   LMP 2020   BMI 32.28 kg/m²     FHT:  134 BPM    Uterine Size: 35 cm   Presentations: cephalic   Pelvic Exam:     Dilation: Closed    Effacement: Long    Station:  -2                 Assessment    There are no diagnoses linked to this encounter.    1) Pregnancy at 38w0d  2) Fetal status reassuring   3) GBS status - negative   4) Prior , repeat at 39 weeks next Friday with Dr. Leyva     Plan    Labor warnings   [facility] BID        Rodolfo Andre MD   2021  12:18 EDT

## 2021-06-18 ENCOUNTER — ANESTHESIA EVENT (OUTPATIENT)
Dept: LABOR AND DELIVERY | Facility: HOSPITAL | Age: 26
End: 2021-06-18

## 2021-06-18 ENCOUNTER — ANESTHESIA (OUTPATIENT)
Dept: LABOR AND DELIVERY | Facility: HOSPITAL | Age: 26
End: 2021-06-18

## 2021-06-18 ENCOUNTER — HOSPITAL ENCOUNTER (INPATIENT)
Facility: HOSPITAL | Age: 26
LOS: 3 days | Discharge: HOME | End: 2021-06-21
Attending: STUDENT IN AN ORGANIZED HEALTH CARE EDUCATION/TRAINING PROGRAM | Admitting: STUDENT IN AN ORGANIZED HEALTH CARE EDUCATION/TRAINING PROGRAM

## 2021-06-18 DIAGNOSIS — Z30.09 UNWANTED FERTILITY: ICD-10-CM

## 2021-06-18 DIAGNOSIS — Z34.90 PREGNANCY, UNSPECIFIED GESTATIONAL AGE: ICD-10-CM

## 2021-06-18 DIAGNOSIS — Z98.891 S/P CESAREAN SECTION: Primary | ICD-10-CM

## 2021-06-18 DIAGNOSIS — Z98.891 HISTORY OF CESAREAN DELIVERY: ICD-10-CM

## 2021-06-18 LAB
ABO GROUP BLD: NORMAL
BLD GP AB SCN SERPL QL: NEGATIVE
DEPRECATED RDW RBC AUTO: 40.9 FL (ref 37–54)
ERYTHROCYTE [DISTWIDTH] IN BLOOD BY AUTOMATED COUNT: 13.1 % (ref 12.3–15.4)
GLUCOSE BLDC GLUCOMTR-MCNC: 94 MG/DL (ref 70–130)
HCT VFR BLD AUTO: 33.8 % (ref 34–46.6)
HGB BLD-MCNC: 11.3 G/DL (ref 12–15.9)
MCH RBC QN AUTO: 29.2 PG (ref 26.6–33)
MCHC RBC AUTO-ENTMCNC: 33.4 G/DL (ref 31.5–35.7)
MCV RBC AUTO: 87.3 FL (ref 79–97)
PLATELET # BLD AUTO: 191 10*3/MM3 (ref 140–450)
PMV BLD AUTO: 11.8 FL (ref 6–12)
RBC # BLD AUTO: 3.87 10*6/MM3 (ref 3.77–5.28)
RH BLD: POSITIVE
SARS-COV-2 RNA PNL SPEC NAA+PROBE: NOT DETECTED
T&S EXPIRATION DATE: NORMAL
WBC # BLD AUTO: 9.41 10*3/MM3 (ref 3.4–10.8)

## 2021-06-18 PROCEDURE — 59515 CESAREAN DELIVERY: CPT | Performed by: STUDENT IN AN ORGANIZED HEALTH CARE EDUCATION/TRAINING PROGRAM

## 2021-06-18 PROCEDURE — 25010000002 DROPERIDOL PER 5 MG: Performed by: ANESTHESIOLOGY

## 2021-06-18 PROCEDURE — 25010000002 PHENYLEPHRINE PER 1 ML: Performed by: REGISTERED NURSE

## 2021-06-18 PROCEDURE — 58611 LIGATE OVIDUCT(S) ADD-ON: CPT | Performed by: STUDENT IN AN ORGANIZED HEALTH CARE EDUCATION/TRAINING PROGRAM

## 2021-06-18 PROCEDURE — 59514 CESAREAN DELIVERY ONLY: CPT | Performed by: OBSTETRICS & GYNECOLOGY

## 2021-06-18 PROCEDURE — 88302 TISSUE EXAM BY PATHOLOGIST: CPT | Performed by: STUDENT IN AN ORGANIZED HEALTH CARE EDUCATION/TRAINING PROGRAM

## 2021-06-18 PROCEDURE — 87635 SARS-COV-2 COVID-19 AMP PRB: CPT | Performed by: STUDENT IN AN ORGANIZED HEALTH CARE EDUCATION/TRAINING PROGRAM

## 2021-06-18 PROCEDURE — 25010000003 CEFAZOLIN IN DEXTROSE 2-4 GM/100ML-% SOLUTION: Performed by: STUDENT IN AN ORGANIZED HEALTH CARE EDUCATION/TRAINING PROGRAM

## 2021-06-18 PROCEDURE — S0260 H&P FOR SURGERY: HCPCS | Performed by: STUDENT IN AN ORGANIZED HEALTH CARE EDUCATION/TRAINING PROGRAM

## 2021-06-18 PROCEDURE — 58611 LIGATE OVIDUCT(S) ADD-ON: CPT | Performed by: OBSTETRICS & GYNECOLOGY

## 2021-06-18 PROCEDURE — 86901 BLOOD TYPING SEROLOGIC RH(D): CPT | Performed by: STUDENT IN AN ORGANIZED HEALTH CARE EDUCATION/TRAINING PROGRAM

## 2021-06-18 PROCEDURE — 25010000002 FENTANYL CITRATE (PF) 50 MCG/ML SOLUTION: Performed by: ANESTHESIOLOGY

## 2021-06-18 PROCEDURE — 25010000002 ONDANSETRON PER 1 MG: Performed by: REGISTERED NURSE

## 2021-06-18 PROCEDURE — 82962 GLUCOSE BLOOD TEST: CPT

## 2021-06-18 PROCEDURE — 86850 RBC ANTIBODY SCREEN: CPT | Performed by: STUDENT IN AN ORGANIZED HEALTH CARE EDUCATION/TRAINING PROGRAM

## 2021-06-18 PROCEDURE — 0UB70ZZ EXCISION OF BILATERAL FALLOPIAN TUBES, OPEN APPROACH: ICD-10-PCS | Performed by: STUDENT IN AN ORGANIZED HEALTH CARE EDUCATION/TRAINING PROGRAM

## 2021-06-18 PROCEDURE — 25010000002 ONDANSETRON PER 1 MG: Performed by: ANESTHESIOLOGY

## 2021-06-18 PROCEDURE — 25010000002 MORPHINE PER 10 MG: Performed by: ANESTHESIOLOGY

## 2021-06-18 PROCEDURE — 25010000002 PROMETHAZINE PER 50 MG: Performed by: ANESTHESIOLOGY

## 2021-06-18 PROCEDURE — 85027 COMPLETE CBC AUTOMATED: CPT | Performed by: STUDENT IN AN ORGANIZED HEALTH CARE EDUCATION/TRAINING PROGRAM

## 2021-06-18 PROCEDURE — 25010000002 KETOROLAC TROMETHAMINE PER 15 MG: Performed by: REGISTERED NURSE

## 2021-06-18 PROCEDURE — 86900 BLOOD TYPING SEROLOGIC ABO: CPT | Performed by: STUDENT IN AN ORGANIZED HEALTH CARE EDUCATION/TRAINING PROGRAM

## 2021-06-18 PROCEDURE — 25010000002 KETOROLAC TROMETHAMINE PER 15 MG: Performed by: STUDENT IN AN ORGANIZED HEALTH CARE EDUCATION/TRAINING PROGRAM

## 2021-06-18 RX ORDER — OXYTOCIN-SODIUM CHLORIDE 0.9% IV SOLN 30 UNIT/500ML 30-0.9/5 UT/ML-%
999 SOLUTION INTRAVENOUS ONCE
Status: COMPLETED | OUTPATIENT
Start: 2021-06-18 | End: 2021-06-18

## 2021-06-18 RX ORDER — PHYTONADIONE 1 MG/.5ML
INJECTION, EMULSION INTRAMUSCULAR; INTRAVENOUS; SUBCUTANEOUS
Status: DISPENSED
Start: 2021-06-18 | End: 2021-06-18

## 2021-06-18 RX ORDER — ACETAMINOPHEN 500 MG
1000 TABLET ORAL ONCE
Status: COMPLETED | OUTPATIENT
Start: 2021-06-18 | End: 2021-06-18

## 2021-06-18 RX ORDER — ONDANSETRON 2 MG/ML
4 INJECTION INTRAMUSCULAR; INTRAVENOUS ONCE AS NEEDED
Status: COMPLETED | OUTPATIENT
Start: 2021-06-18 | End: 2021-06-18

## 2021-06-18 RX ORDER — ONDANSETRON 4 MG/1
4 TABLET, FILM COATED ORAL EVERY 8 HOURS PRN
Status: DISCONTINUED | OUTPATIENT
Start: 2021-06-18 | End: 2021-06-21 | Stop reason: HOSPADM

## 2021-06-18 RX ORDER — DOCUSATE SODIUM 100 MG/1
100 CAPSULE, LIQUID FILLED ORAL 2 TIMES DAILY PRN
Status: DISCONTINUED | OUTPATIENT
Start: 2021-06-18 | End: 2021-06-21 | Stop reason: HOSPADM

## 2021-06-18 RX ORDER — ERYTHROMYCIN 5 MG/G
OINTMENT OPHTHALMIC
Status: DISPENSED
Start: 2021-06-18 | End: 2021-06-18

## 2021-06-18 RX ORDER — KETOROLAC TROMETHAMINE 30 MG/ML
30 INJECTION, SOLUTION INTRAMUSCULAR; INTRAVENOUS EVERY 6 HOURS
Status: COMPLETED | OUTPATIENT
Start: 2021-06-18 | End: 2021-06-19

## 2021-06-18 RX ORDER — SODIUM CHLORIDE 0.9 % (FLUSH) 0.9 %
3 SYRINGE (ML) INJECTION EVERY 12 HOURS SCHEDULED
Status: DISCONTINUED | OUTPATIENT
Start: 2021-06-18 | End: 2021-06-18 | Stop reason: HOSPADM

## 2021-06-18 RX ORDER — METHYLERGONOVINE MALEATE 0.2 MG/ML
200 INJECTION INTRAVENOUS ONCE AS NEEDED
Status: DISCONTINUED | OUTPATIENT
Start: 2021-06-18 | End: 2021-06-18 | Stop reason: HOSPADM

## 2021-06-18 RX ORDER — KETOROLAC TROMETHAMINE 30 MG/ML
INJECTION, SOLUTION INTRAMUSCULAR; INTRAVENOUS AS NEEDED
Status: DISCONTINUED | OUTPATIENT
Start: 2021-06-18 | End: 2021-06-18 | Stop reason: SURG

## 2021-06-18 RX ORDER — LIDOCAINE HYDROCHLORIDE 10 MG/ML
5 INJECTION, SOLUTION EPIDURAL; INFILTRATION; INTRACAUDAL; PERINEURAL AS NEEDED
Status: DISCONTINUED | OUTPATIENT
Start: 2021-06-18 | End: 2021-06-18 | Stop reason: HOSPADM

## 2021-06-18 RX ORDER — DROPERIDOL 2.5 MG/ML
0.62 INJECTION, SOLUTION INTRAMUSCULAR; INTRAVENOUS ONCE
Status: COMPLETED | OUTPATIENT
Start: 2021-06-18 | End: 2021-06-18

## 2021-06-18 RX ORDER — CARBOPROST TROMETHAMINE 250 UG/ML
250 INJECTION, SOLUTION INTRAMUSCULAR AS NEEDED
Status: DISCONTINUED | OUTPATIENT
Start: 2021-06-18 | End: 2021-06-18 | Stop reason: HOSPADM

## 2021-06-18 RX ORDER — OXYCODONE AND ACETAMINOPHEN 10; 325 MG/1; MG/1
1 TABLET ORAL EVERY 4 HOURS PRN
Status: DISCONTINUED | OUTPATIENT
Start: 2021-06-18 | End: 2021-06-21 | Stop reason: HOSPADM

## 2021-06-18 RX ORDER — SODIUM CHLORIDE 0.9 % (FLUSH) 0.9 %
10 SYRINGE (ML) INJECTION AS NEEDED
Status: DISCONTINUED | OUTPATIENT
Start: 2021-06-18 | End: 2021-06-18 | Stop reason: HOSPADM

## 2021-06-18 RX ORDER — ONDANSETRON 2 MG/ML
INJECTION INTRAMUSCULAR; INTRAVENOUS AS NEEDED
Status: DISCONTINUED | OUTPATIENT
Start: 2021-06-18 | End: 2021-06-18 | Stop reason: SURG

## 2021-06-18 RX ORDER — FAMOTIDINE 10 MG/ML
20 INJECTION, SOLUTION INTRAVENOUS ONCE AS NEEDED
Status: COMPLETED | OUTPATIENT
Start: 2021-06-18 | End: 2021-06-18

## 2021-06-18 RX ORDER — SODIUM CHLORIDE, SODIUM LACTATE, POTASSIUM CHLORIDE, CALCIUM CHLORIDE 600; 310; 30; 20 MG/100ML; MG/100ML; MG/100ML; MG/100ML
125 INJECTION, SOLUTION INTRAVENOUS CONTINUOUS
Status: DISCONTINUED | OUTPATIENT
Start: 2021-06-18 | End: 2021-06-18

## 2021-06-18 RX ORDER — MORPHINE SULFATE 1 MG/ML
INJECTION, SOLUTION EPIDURAL; INTRATHECAL; INTRAVENOUS
Status: COMPLETED | OUTPATIENT
Start: 2021-06-18 | End: 2021-06-18

## 2021-06-18 RX ORDER — OXYTOCIN-SODIUM CHLORIDE 0.9% IV SOLN 30 UNIT/500ML 30-0.9/5 UT/ML-%
125 SOLUTION INTRAVENOUS CONTINUOUS PRN
Status: COMPLETED | OUTPATIENT
Start: 2021-06-18 | End: 2021-06-18

## 2021-06-18 RX ORDER — OXYTOCIN-SODIUM CHLORIDE 0.9% IV SOLN 30 UNIT/500ML 30-0.9/5 UT/ML-%
250 SOLUTION INTRAVENOUS CONTINUOUS PRN
Status: ACTIVE | OUTPATIENT
Start: 2021-06-18 | End: 2021-06-18

## 2021-06-18 RX ORDER — DIPHENHYDRAMINE HCL 25 MG
25 CAPSULE ORAL EVERY 4 HOURS PRN
Status: DISCONTINUED | OUTPATIENT
Start: 2021-06-18 | End: 2021-06-21 | Stop reason: HOSPADM

## 2021-06-18 RX ORDER — OXYCODONE HYDROCHLORIDE AND ACETAMINOPHEN 5; 325 MG/1; MG/1
1 TABLET ORAL EVERY 4 HOURS PRN
Status: DISCONTINUED | OUTPATIENT
Start: 2021-06-18 | End: 2021-06-21 | Stop reason: HOSPADM

## 2021-06-18 RX ORDER — ONDANSETRON 2 MG/ML
4 INJECTION INTRAMUSCULAR; INTRAVENOUS EVERY 6 HOURS PRN
Status: DISCONTINUED | OUTPATIENT
Start: 2021-06-18 | End: 2021-06-21 | Stop reason: HOSPADM

## 2021-06-18 RX ORDER — LANOLIN
CREAM (ML) TOPICAL
Status: DISCONTINUED | OUTPATIENT
Start: 2021-06-18 | End: 2021-06-21 | Stop reason: HOSPADM

## 2021-06-18 RX ORDER — BUPIVACAINE HYDROCHLORIDE 7.5 MG/ML
INJECTION, SOLUTION EPIDURAL; RETROBULBAR
Status: COMPLETED | OUTPATIENT
Start: 2021-06-18 | End: 2021-06-18

## 2021-06-18 RX ORDER — FENTANYL CITRATE 50 UG/ML
INJECTION, SOLUTION INTRAMUSCULAR; INTRAVENOUS
Status: COMPLETED | OUTPATIENT
Start: 2021-06-18 | End: 2021-06-18

## 2021-06-18 RX ORDER — MISOPROSTOL 200 UG/1
800 TABLET ORAL AS NEEDED
Status: DISCONTINUED | OUTPATIENT
Start: 2021-06-18 | End: 2021-06-18 | Stop reason: HOSPADM

## 2021-06-18 RX ORDER — CEFAZOLIN SODIUM 2 G/100ML
2 INJECTION, SOLUTION INTRAVENOUS ONCE
Status: COMPLETED | OUTPATIENT
Start: 2021-06-18 | End: 2021-06-18

## 2021-06-18 RX ORDER — IBUPROFEN 600 MG/1
600 TABLET ORAL EVERY 8 HOURS PRN
Status: DISCONTINUED | OUTPATIENT
Start: 2021-06-19 | End: 2021-06-21 | Stop reason: HOSPADM

## 2021-06-18 RX ADMIN — PHENYLEPHRINE HYDROCHLORIDE 100 MCG: 10 INJECTION INTRAVENOUS at 08:34

## 2021-06-18 RX ADMIN — FAMOTIDINE 20 MG: 10 INJECTION INTRAVENOUS at 08:02

## 2021-06-18 RX ADMIN — DOCUSATE SODIUM 100 MG: 100 CAPSULE, LIQUID FILLED ORAL at 22:04

## 2021-06-18 RX ADMIN — OXYTOCIN 999 ML/HR: 10 INJECTION INTRAVENOUS at 08:45

## 2021-06-18 RX ADMIN — FENTANYL CITRATE 20 MCG: 50 INJECTION INTRAMUSCULAR; INTRAVENOUS at 08:25

## 2021-06-18 RX ADMIN — SODIUM CHLORIDE, POTASSIUM CHLORIDE, SODIUM LACTATE AND CALCIUM CHLORIDE 125 ML/HR: 600; 310; 30; 20 INJECTION, SOLUTION INTRAVENOUS at 06:55

## 2021-06-18 RX ADMIN — ACETAMINOPHEN 1000 MG: 500 TABLET, FILM COATED ORAL at 08:03

## 2021-06-18 RX ADMIN — KETOROLAC TROMETHAMINE 30 MG: 30 INJECTION, SOLUTION INTRAMUSCULAR; INTRAVENOUS at 16:05

## 2021-06-18 RX ADMIN — PHENYLEPHRINE HYDROCHLORIDE 100 MCG: 10 INJECTION INTRAVENOUS at 08:26

## 2021-06-18 RX ADMIN — CEFAZOLIN SODIUM 2 G: 2 INJECTION, SOLUTION INTRAVENOUS at 08:02

## 2021-06-18 RX ADMIN — PROMETHAZINE HYDROCHLORIDE 12.5 MG: 25 INJECTION INTRAMUSCULAR; INTRAVENOUS at 11:04

## 2021-06-18 RX ADMIN — PHENYLEPHRINE HYDROCHLORIDE 100 MCG: 10 INJECTION INTRAVENOUS at 08:37

## 2021-06-18 RX ADMIN — SODIUM CHLORIDE, POTASSIUM CHLORIDE, SODIUM LACTATE AND CALCIUM CHLORIDE 1000 ML: 600; 310; 30; 20 INJECTION, SOLUTION INTRAVENOUS at 05:55

## 2021-06-18 RX ADMIN — SODIUM CHLORIDE, POTASSIUM CHLORIDE, SODIUM LACTATE AND CALCIUM CHLORIDE: 600; 310; 30; 20 INJECTION, SOLUTION INTRAVENOUS at 08:59

## 2021-06-18 RX ADMIN — BUPIVACAINE HYDROCHLORIDE 1.6 ML: 7.5 INJECTION, SOLUTION EPIDURAL; RETROBULBAR at 08:25

## 2021-06-18 RX ADMIN — KETOROLAC TROMETHAMINE 30 MG: 30 INJECTION, SOLUTION INTRAMUSCULAR; INTRAVENOUS at 22:04

## 2021-06-18 RX ADMIN — OXYTOCIN 125 ML/HR: 10 INJECTION INTRAVENOUS at 10:26

## 2021-06-18 RX ADMIN — DROPERIDOL 0.62 MG: 2.5 INJECTION, SOLUTION INTRAMUSCULAR; INTRAVENOUS at 10:07

## 2021-06-18 RX ADMIN — KETOROLAC TROMETHAMINE 30 MG: 30 INJECTION, SOLUTION INTRAMUSCULAR; INTRAVENOUS at 09:15

## 2021-06-18 RX ADMIN — MORPHINE SULFATE 200 MCG: 1 INJECTION, SOLUTION EPIDURAL; INTRATHECAL; INTRAVENOUS at 08:25

## 2021-06-18 RX ADMIN — PHENYLEPHRINE HYDROCHLORIDE 100 MCG: 10 INJECTION INTRAVENOUS at 08:55

## 2021-06-18 RX ADMIN — ONDANSETRON 4 MG: 2 INJECTION INTRAMUSCULAR; INTRAVENOUS at 08:02

## 2021-06-18 RX ADMIN — ONDANSETRON HYDROCHLORIDE 4 MG: 2 SOLUTION INTRAMUSCULAR; INTRAVENOUS at 08:20

## 2021-06-18 NOTE — H&P
[facility]  Obstetric History and Physical    Chief Complaint   Patient presents with   • Scheduled      repeat c/s, 1st baby was 9#       Patient is a 25 y.o. female  currently at 39w0d, who presents for scheduled repeat  section and tubal ligation for history of  section and unwanted fertility. She reports doing well and denies contractions, vaginal bleeding, or leakage of fluid. She reports active fetal movement.     Her prenatal care was complicated by history of C/S x1, recurrent UTIs on Macrobid suppression, unwanted fertility.     External Prenatal Results     Pregnancy Outside Results - Transcribed From Office Records - See Scanned Records For Details     Test Value Date Time    ABO  O  21    Rh  Positive  21    Antibody Screen  Negative  21 05    Varicella IgG       Rubella ^ Immune  20     Hgb  11.3 g/dL 21 0558       11.6 g/dL 21 1440    Hct  33.8 % 21 0558       33.7 % 21 1440    Glucose Fasting GTT       Glucose Tolerance Test 1 hour       Glucose Tolerance Test 3 hour       Gonorrhea (discrete)       Chlamydia (discrete)       RPR ^ Non-Reactive  20     VDRL       Syphilis Antibody       HBsAg ^ Negative  20     Herpes Simplex Virus PCR       Herpes Simplex VIrus Culture       HIV ^ Non-Reactive  20     Hep C RNA Quant PCR       Hep C Antibody ^ neg  20     AFP       Group B Strep  Negative  21 0411    GBS Susceptibility to Clindamycin       GBS Susceptibility to Erythromycin       Fetal Fibronectin       Genetic Testing, Maternal Blood             Drug Screening     Test Value Date Time    Urine Drug Screen       Amphetamine Screen       Barbiturate Screen       Benzodiazepine Screen       Methadone Screen       Phencyclidine Screen       Opiates Screen       THC Screen       Cocaine Screen       Propoxyphene Screen       Buprenorphine Screen       Methamphetamine Screen        Oxycodone Screen       Tricyclic Antidepressants Screen             Legend    ^: Historical                           OB History    Para Term  AB Living   2 1 1 0 0 1   SAB TAB Ectopic Molar Multiple Live Births   0 0 0 0 0 1      # Outcome Date GA Lbr Jaron/2nd Weight Sex Delivery Anes PTL Lv   2 Current            1 Term 17 40w2d  4224 g (9 lb 5 oz) F CS-LTranv Spinal N JOHN      Name: Eloisa           Past Medical History:   Diagnosis Date   • Anemia    • Urinary tract infection     x1 during pregnancy - treated          Past Surgical History:   Procedure Laterality Date   •  SECTION         • TONSILLECTOMY      as child          No current facility-administered medications on file prior to encounter.     Current Outpatient Medications on File Prior to Encounter   Medication Sig Dispense Refill   • Ferrous Sulfate (IRON PO) Take  by mouth.     • Prenatal Vit-Fe Fumarate-FA (PRENATAL, CLASSIC, VITAMIN) 28-0.8 MG tablet tablet Take  by mouth Daily.          No Known Allergies       Social History     Socioeconomic History   • Marital status: Single     Spouse name: Not on file   • Number of children: Not on file   • Years of education: Not on file   • Highest education level: Not on file   Tobacco Use   • Smoking status: Never Smoker   • Smokeless tobacco: Never Used   Substance and Sexual Activity   • Alcohol use: No   • Drug use: No   • Sexual activity: Yes     Partners: Male        History reviewed. No pertinent family history.     Review of Systems   All other systems reviewed and are negative.      /78 (BP Location: Left arm, Patient Position: Lying)   Pulse 101   Temp 98.5 °F (36.9 °C) (Oral)   Resp 16   Wt 91.5 kg (201 lb 12.8 oz)   LMP 2020   SpO2 98%   Breastfeeding No   BMI 32.57 kg/m²     Physical Exam  Vitals reviewed.   Constitutional:       General: She is not in acute distress.     Appearance: Normal appearance.   HENT:      Head: Normocephalic and  atraumatic.      Right Ear: External ear normal.      Left Ear: External ear normal.   Eyes:      Extraocular Movements: Extraocular movements intact.      Pupils: Pupils are equal, round, and reactive to light.   Cardiovascular:      Rate and Rhythm: Normal rate and regular rhythm.   Pulmonary:      Effort: Pulmonary effort is normal. No respiratory distress.   Abdominal:      General: There is no distension.      Palpations: Abdomen is soft.      Tenderness: There is no abdominal tenderness. There is no guarding or rebound.      Comments: Gravid third trimester uterus.    Musculoskeletal:         General: No deformity. Normal range of motion.      Cervical back: Normal range of motion and neck supple.   Skin:     General: Skin is warm and dry.   Neurological:      General: No focal deficit present.      Mental Status: She is alert and oriented to person, place, and time.   Psychiatric:         Mood and Affect: Mood normal.         Behavior: Behavior normal.         FHT - 125s baseline, moderate variability, accels +, decels -. Catgory 1  [place] - Occasional contractions with uterine irritability     Lab Results   Component Value Date    WBC 9.41 2021    HGB 11.3 (L) 2021    HCT 33.8 (L) 2021    MCV 87.3 2021     2021     Assessment:  1.  Intrauterine pregnancy at 39w0d weeks gestation with reactive fetal status.    2.  Scheduled repeat  section with tubal ligation   3.  Obstetrical history significant for is remarkable for history of C/s x 1, unwanted fertility, and recurrent UTIs on antibiotics suppression   4.  GBS status: negative    Plan:  1. Admitted for repeat  section with tubal ligation. Reviewed risks and consents signed. NPO at [facility]. Antibiotics: Ancef 2g ordered. DVT PPx: SCDs. Will proceed to the OR for planned procedure.   2. Plan of care has been reviewed with patient and partner.  3.  Risks, benefits of treatment plan have been discussed.  4.  All  questions have been answered.        Pauly Leyva MD  6/18/2021  07:41 EDT

## 2021-06-18 NOTE — ANESTHESIA POSTPROCEDURE EVALUATION
Patient: Carlyn Cali    Procedure Summary     Date: 21 Room / Location:  [facility] /  [facility]    Anesthesia Start: 813 Anesthesia Stop: 933    Procedure:  SECTION REPEAT WITH TUBAL (N/A Abdomen) Diagnosis:       History of  delivery      Unwanted fertility      Pregnancy, unspecified gestational age      (History of  delivery [Z98.891])      (Unwanted fertility [Z30.09])      (Pregnancy, unspecified gestational age [Z34.90])    Surgeons: Pauly Leyva MD Provider: Lexa Mcginnis MD    Anesthesia Type: spinal ASA Status: 2          Anesthesia Type: spinal    Vitals  Vitals Value Taken Time   BP 92/42 21 0946   Temp 36.5 °C (97.7 °F) 21 0934   Pulse 108 21 0955   Resp 18 21 0946   SpO2 99 % 21 0955   Vitals shown include unvalidated device data.        Post Anesthesia Care and Evaluation    Patient location during evaluation: bedside  Patient participation: complete - patient participated  Level of consciousness: awake and alert  Pain score: 0  Pain management: adequate  Airway patency: patent  Anesthetic complications: No anesthetic complications  PONV Status: controlled  Cardiovascular status: acceptable  Respiratory status: acceptable  Hydration status: acceptable    Comments: BP 92/42 (BP Location: Left arm, Patient Position: Sitting)   Pulse 76   Temp 36.5 °C (97.7 °F) (Oral)   Resp 18   Wt 91.5 kg (201 lb 12.8 oz)   LMP 2020   SpO2 98%   Breastfeeding Unknown   BMI 32.57 kg/m²

## 2021-06-18 NOTE — PLAN OF CARE
Problem: Bleeding ( Delivery)  Goal: Bleeding is Controlled  Outcome: Met     Problem: Change in Fetal Wellbeing ( Delivery)  Goal: Stable Fetal Wellbeing  Outcome: Met     Problem: Infection ( Delivery)  Goal: Absence of Infection Signs and Symptoms  Outcome: Met     Problem: Respiratory Compromise ( Delivery)  Goal: Effective Oxygenation and Ventilation  Outcome: Met     Problem:  Fall Injury Risk  Goal: Absence of Fall, Infant Drop and Related Injury  Outcome: Met     Problem: Skin Injury Risk Increased  Goal: Skin Health and Integrity  Outcome: Met     Problem: Device-Related Complication Risk (Anesthesia/Analgesia, Neuraxial)  Goal: Safe Infusion Delivery Completion  Outcome: Met     Problem: Infection (Anesthesia/Analgesia, Neuraxial)  Goal: Absence of Infection Signs and Symptoms  Outcome: Met     Problem: Nausea and Vomiting (Anesthesia/Analgesia, Neuraxial)  Goal: Nausea and Vomiting Relief  Outcome: Met     Problem: Pain (Anesthesia/Analgesia, Neuraxial)  Goal: Effective Pain Control  Outcome: Met     Problem: Respiratory Compromise (Anesthesia/Analgesia, Neuraxial)  Goal: Effective Oxygenation and Ventilation  Outcome: Met     Problem: Sensorimotor Impairment (Anesthesia/Analgesia, Neuraxial)  Goal: Baseline Motor Function  Outcome: Met     Problem: Urinary Retention (Anesthesia/Analgesia, Neuraxial)  Goal: Effective Urinary Elimination  Outcome: Met  Intervention: Promote Effective Urine Elimination  Recent Flowsheet Documentation  Taken 2021 0934 by Kim Boothe RN  Urinary Elimination Promotion: catheter patency maintained   Goal Outcome Evaluation:

## 2021-06-18 NOTE — OP NOTE
Operative Report: Low Transverse  Section    Patient Name: Carlyn Cali    MRN: [id number]    Date of Surgery: 2021    Pre-op Diagnoses:   1. Intrauterine Pregnancy at 39w0d  2. History of  section x 1  3. Unwanted fertility   4. History of recurrent UTI    Post op Diagnoses: same    Procedure: Repeat low transverse  section via pfannenstiel incision with tubal ligation via distal fimbriectomy     Surgeon: Pauly Leyva MD  Assistant: Jens Randhawa MD; Dr. Randhawa assisted in abdominal entry, knot tying, retraction, delivery of the fetus, and abdominal closure.    Anesthesia: Spinal    QBL: 260 cc     Specimen:   ID Type Source Tests Collected by Time   A :  Tissue Fallopian Tube, Left TISSUE PATHOLOGY EXAM Pauly Leyva MD 2021 0854   B :  Tissue Fallopian Tube, Right TISSUE PATHOLOGY EXAM Pauly Leyva MD 2021 0855       Complication: None     Findings: thickened fascia, normal uterus, tubes and ovaries, no adhesive disease    Infant Details: female infant in vertex presentation, weight 3340 g, apgars 8/8     Indication and Consent  Carlyn Cali is a 25 y.o.  at 39w0d who presented for scheduled repeat  section and tubal ligation for history of  section and unwanted fertility. The patient understood that the risks of  section include, but are not limited to, visceral or vascular injury, infection, blood loss and need for transfusion, prolonged hospitalization and reoperation. The patient stated understanding and desired to proceed. All questions were answered.    Procedure:  The patient was taken to the operating room where spinal anesthesia was found to be adequate. 2 grams of ancef were given for infection prophylaxis. She was prepped and draped in the dorsal supine position with a leftward tilt. A Pfannenstiel skin incision was made with the scalpel. The incision was carried down to the fascia sharply. The superior aspect of the  fascia was grasped with Kocher clamps and the underlying rectus muscle was dissected off sharply with rogers scissors. In a similar fashion, the inferior aspect of the fascia was grasped with kocher clamps and the rectus muscle and pyramidalis muscle were dissected off with rogers scissors. The rectus muscles were  in the midline with hemostats and the peritoneum entered bluntly. The Bovie was used to extend the peritoneal incision superiorly.  The peritoneal incision was then bluntly extended superiorly and inferiorly to the bladder reflection with good visualization of the bladder.    The bladder blade was inserted and vesicouterine peritoneum identified. Intraabdominal survey revealed scant, clear peritoneal fluid and the thinned out lower uterine segment. The vesicouterine peritoneum was opened with scissors and the bladder flap was developed. The bladder blade was repositioned to keep the bladder out of the operative field. The lower uterine segment was incised with a scalpel. The amniotic sac was ruptured and clear fluid was noted. The uterine incision was extended bluntly with upward traction.    The fetus was in cephalic position. The head was elevated out of the pelvis with special attention paid to avoid using the uterine incision as a fulcrum. Gentle fundal pressure was applied once the head was brought into the incision. The infant was delivered with no difficulty. The mouth and nose were suctioned with a bulb. The cord was clamped and cut. The infant was handed to the pediatrician. IV oxytocin was initiated to facilitate uterine contractions. The placenta was delivered intact with manual massage of the uterine fundus. The uterus was then exteriorized. The inside of the uterus was gently wiped with a lap sponge to assure complete placental membrane removal. The uterine incision was closed with 0-0 Monocryl in a running locked fashion. A second imbricating layer was performed. Hemostasis was noted.  The ovaries and tubes were found to be normal.     Attention was then turned to the left uterine tube which was grasped and elevated and a Marie clamp was placed across the mesosalpinx of the fimbria. The fimbria was removed with Metzenbaum scissors and the pedicle was doubly ligated with 0 plain gut suture. Hemostasis of the tubal site was noted. This was repeated in similar fashion on the right uterine tube.     The uterus, tubes and ovaries were then returned to the abdominal cavity. The blood clots and fluid were wiped out of the abdomen and pelvis with moist laparotomy sponges. The uterine incision was reinspected and a figure of eight suture was placed in the center of the incision with 0- monocryl. Hemostasis was then noted of the hysterotomy incision.      The peritoneum was closed with a 2-0 Vicryl in a continuous running fashion.  The rectus muscles and fascia were inspected and noted to be hemostatic.  The fascial layer was closed with 0-0 Vicryl suture. 3-0 Vicryl was used to reapproximate the subcutaneous tissues. The skin was closed with 4-0 Monocryl in a subcuticular fashion. The patient tolerated the procedure well. All the counts were correct times two. The patient was taken to the recovery room in stable condition.    Pauly Leyva MD

## 2021-06-18 NOTE — ANESTHESIA PREPROCEDURE EVALUATION
Anesthesia Evaluation     Patient summary reviewed and Nursing notes reviewed   no history of anesthetic complications:  NPO Solid Status: > 8 hours  NPO Liquid Status: > 2 hours           Airway   Mallampati: II  TM distance: >3 FB  Neck ROM: full  no difficulty expected  Dental - normal exam     Pulmonary - negative pulmonary ROS and normal exam   (-) COPD, asthma, not a smoker, lung cancer  Cardiovascular - negative cardio ROS and normal exam  Exercise tolerance: excellent (>7 METS)    ECG reviewed  Rhythm: regular  Rate: normal    (-) hypertension, valvular problems/murmurs, past MI, CAD, dysrhythmias, angina, CHF, cardiac stents, CABG, pericardial effusion      Neuro/Psych- negative ROS  (-) seizures, TIA, CVA  GI/Hepatic/Renal/Endo - negative ROS   (-) hiatal hernia, GERD, PUD, hepatitis, liver disease, no renal disease, diabetes, GI bleed, no thyroid disorder    Musculoskeletal (-) negative ROS    Abdominal  - normal exam   Substance History - negative use     OB/GYN    (+) Pregnant,     Comment: 39wk IUP      Other - negative ROS                     Anesthesia Plan    ASA 2     spinal       Anesthetic plan, all risks, benefits, and alternatives have been provided, discussed and informed consent has been obtained with: patient.    Plan discussed with CRNA.

## 2021-06-18 NOTE — ANESTHESIA PROCEDURE NOTES
Spinal Block      Patient reassessed immediately prior to procedure    Patient location during procedure: OR  Indication:at surgeon's request  Performed By  Anesthesiologist: Lexa Mcginnis MD  Preanesthetic Checklist  Completed: patient identified, IV checked, site marked, risks and benefits discussed, surgical consent, monitors and equipment checked, pre-op evaluation and timeout performed  Spinal Block Prep:  Patient Position:sitting  Sterile Tech:cap, gloves and sterile barriers  Prep:Chloraprep  Patient Monitoring:EKG, continuous pulse oximetry and blood pressure monitoring  Spinal Block Procedure  Approach:midline  Guidance:landmark technique and palpation technique  Location:L3-L4  Needle Type:Sprotte  Needle Gauge:24 G  Placement of Spinal needle event:cerebrospinal fluid aspirated  Paresthesia: no  Fluid Appearance:clear  Medications: fentaNYL citrate (PF) (SUBLIMAZE) injection, 20 mcg  Morphine PF injection, 200 mcg  bupivacaine PF (MARCAINE) 0.75 % injection, 1.6 mL  Med Administered at 6/18/2021 8:25 AM   Post Assessment  Patient Tolerance:patient tolerated the procedure well with no apparent complications  Complications no

## 2021-06-19 LAB
BASOPHILS # BLD AUTO: 0.04 10*3/MM3 (ref 0–0.2)
BASOPHILS NFR BLD AUTO: 0.4 % (ref 0–1.5)
DEPRECATED RDW RBC AUTO: 41.6 FL (ref 37–54)
EOSINOPHIL # BLD AUTO: 0.09 10*3/MM3 (ref 0–0.4)
EOSINOPHIL NFR BLD AUTO: 0.9 % (ref 0.3–6.2)
ERYTHROCYTE [DISTWIDTH] IN BLOOD BY AUTOMATED COUNT: 12.9 % (ref 12.3–15.4)
HCT VFR BLD AUTO: 31.5 % (ref 34–46.6)
HGB BLD-MCNC: 10.5 G/DL (ref 12–15.9)
IMM GRANULOCYTES # BLD AUTO: 0.18 10*3/MM3 (ref 0–0.05)
IMM GRANULOCYTES NFR BLD AUTO: 1.8 % (ref 0–0.5)
LYMPHOCYTES # BLD AUTO: 1.78 10*3/MM3 (ref 0.7–3.1)
LYMPHOCYTES NFR BLD AUTO: 17.7 % (ref 19.6–45.3)
MCH RBC QN AUTO: 29.3 PG (ref 26.6–33)
MCHC RBC AUTO-ENTMCNC: 33.3 G/DL (ref 31.5–35.7)
MCV RBC AUTO: 88 FL (ref 79–97)
MONOCYTES # BLD AUTO: 0.89 10*3/MM3 (ref 0.1–0.9)
MONOCYTES NFR BLD AUTO: 8.8 % (ref 5–12)
NEUTROPHILS NFR BLD AUTO: 7.08 10*3/MM3 (ref 1.7–7)
NEUTROPHILS NFR BLD AUTO: 70.4 % (ref 42.7–76)
NRBC BLD AUTO-RTO: 0 /100 WBC (ref 0–0.2)
PLATELET # BLD AUTO: 186 10*3/MM3 (ref 140–450)
PMV BLD AUTO: 11.8 FL (ref 6–12)
RBC # BLD AUTO: 3.58 10*6/MM3 (ref 3.77–5.28)
WBC # BLD AUTO: 10.06 10*3/MM3 (ref 3.4–10.8)

## 2021-06-19 PROCEDURE — 0503F POSTPARTUM CARE VISIT: CPT | Performed by: OBSTETRICS & GYNECOLOGY

## 2021-06-19 PROCEDURE — 85025 COMPLETE CBC W/AUTO DIFF WBC: CPT | Performed by: STUDENT IN AN ORGANIZED HEALTH CARE EDUCATION/TRAINING PROGRAM

## 2021-06-19 PROCEDURE — 25010000002 KETOROLAC TROMETHAMINE PER 15 MG: Performed by: STUDENT IN AN ORGANIZED HEALTH CARE EDUCATION/TRAINING PROGRAM

## 2021-06-19 RX ADMIN — OXYCODONE AND ACETAMINOPHEN 1 TABLET: 5; 325 TABLET ORAL at 19:45

## 2021-06-19 RX ADMIN — IBUPROFEN 600 MG: 600 TABLET, FILM COATED ORAL at 23:40

## 2021-06-19 RX ADMIN — OXYCODONE HYDROCHLORIDE AND ACETAMINOPHEN 1 TABLET: 10; 325 TABLET ORAL at 23:41

## 2021-06-19 RX ADMIN — IBUPROFEN 600 MG: 600 TABLET, FILM COATED ORAL at 14:04

## 2021-06-19 RX ADMIN — DOCUSATE SODIUM 100 MG: 100 CAPSULE, LIQUID FILLED ORAL at 09:29

## 2021-06-19 RX ADMIN — OXYCODONE AND ACETAMINOPHEN 1 TABLET: 5; 325 TABLET ORAL at 14:04

## 2021-06-19 RX ADMIN — KETOROLAC TROMETHAMINE 30 MG: 30 INJECTION, SOLUTION INTRAMUSCULAR; INTRAVENOUS at 05:37

## 2021-06-19 NOTE — PLAN OF CARE
Problem: Adult Inpatient Plan of Care  Goal: Plan of Care Review  Outcome: Ongoing, Progressing  Flowsheets (Taken 6/19/2021 0643)  Progress: improving  Plan of Care Reviewed With: patient  Outcome Summary: VSS.  Pt is ambultaing.  FC removed.  DTV @ 7437.  Pain controlled w/scheduled toradol.   Goal Outcome Evaluation:  Plan of Care Reviewed With: patient        Progress: improving  Outcome Summary: VSS.  Pt is ambultaing.  FC removed.  DTV @ 1137.  Pain controlled w/scheduled toradol.

## 2021-06-19 NOTE — PROGRESS NOTES
DAILY PROGRESS NOTE    Patient Identification:  Name: Carlyn Cali  Age: 25 y.o.  Sex: female  :  1995  MRN: [id number]               Subjective:  Interval History: POD 1 from a  delivery.  Pain is controlled with pain medication.  Lochia is minimal.       Objective:    Scheduled Meds:   Continuous Infusions:   PRN Meds:•  all purpose nipple ointment  •  diphenhydrAMINE  •  docusate sodium  •  ibuprofen  •  lanolin  •  ondansetron  •  ondansetron  •  oxyCODONE-acetaminophen  •  oxyCODONE-acetaminophen    Vital signs in last 24 hours:  Temp:  [97.7 °F (36.5 °C)-98.5 °F (36.9 °C)] 97.7 °F (36.5 °C)  Heart Rate:  [64-86] 72  Resp:  [13-16] 16  BP: ()/(52-71) 109/71    Intake/Output:    Intake/Output Summary (Last 24 hours) at 2021 1231  Last data filed at 2021 1203  Gross per 24 hour   Intake 120 ml   Output 1620 ml   Net -1500 ml       Exam:  General Appearance:    Alert, cooperative, no distress, appears stated age   Lungs:     Clear to auscultation bilaterally, respirations unlabored    Heart:    Regular rate and rhythm, S1 and S2 normal, no murmur, rub   or gallop   Abdomen:   Soft.  Fundus is firm and below the umbilicus.  Dressing is dry   Extremities: Equal in size   Skin:   Skin color, texture, turgor normal, no rashes or lesions        Data Review:    Lab Results (last 24 hours)     Procedure Component Value Units Date/Time    CBC & Differential [[id number]]  (Abnormal) Collected: 21    Specimen: Blood from Arm, Right Updated: 21    Narrative:      The following orders were created for panel order CBC & Differential.  Procedure                               Abnormality         Status                     ---------                               -----------         ------                     CBC Auto Differential[[id number]]        Abnormal            Final result                 Please view results for these tests on the individual orders.    CBC Auto  Differential [[id number]]  (Abnormal) Collected: 21 0859    Specimen: Blood from Arm, Right Updated: 21     WBC 10.06 10*3/mm3      RBC 3.58 10*6/mm3      Hemoglobin 10.5 g/dL      Hematocrit 31.5 %      MCV 88.0 fL      MCH 29.3 pg      MCHC 33.3 g/dL      RDW 12.9 %      RDW-SD 41.6 fl      MPV 11.8 fL      Platelets 186 10*3/mm3      Neutrophil % 70.4 %      Lymphocyte % 17.7 %      Monocyte % 8.8 %      Eosinophil % 0.9 %      Basophil % 0.4 %      Immature Grans % 1.8 %      Neutrophils, Absolute 7.08 10*3/mm3      Lymphocytes, Absolute 1.78 10*3/mm3      Monocytes, Absolute 0.89 10*3/mm3      Eosinophils, Absolute 0.09 10*3/mm3      Basophils, Absolute 0.04 10*3/mm3      Immature Grans, Absolute 0.18 10*3/mm3      nRBC 0.0 /100 WBC         Assessment:    History of  delivery    Unwanted fertility    S/P  section    Appropriate progress, POD 1.          Elia Pimentel MD  2021

## 2021-06-20 RX ADMIN — OXYCODONE AND ACETAMINOPHEN 1 TABLET: 5; 325 TABLET ORAL at 21:23

## 2021-06-20 RX ADMIN — DOCUSATE SODIUM 100 MG: 100 CAPSULE, LIQUID FILLED ORAL at 21:23

## 2021-06-20 RX ADMIN — OXYCODONE HYDROCHLORIDE AND ACETAMINOPHEN 1 TABLET: 10; 325 TABLET ORAL at 16:37

## 2021-06-20 RX ADMIN — IBUPROFEN 600 MG: 600 TABLET, FILM COATED ORAL at 16:38

## 2021-06-20 RX ADMIN — OXYCODONE HYDROCHLORIDE AND ACETAMINOPHEN 1 TABLET: 10; 325 TABLET ORAL at 08:13

## 2021-06-20 RX ADMIN — IBUPROFEN 600 MG: 600 TABLET, FILM COATED ORAL at 08:13

## 2021-06-20 RX ADMIN — DOCUSATE SODIUM 100 MG: 100 CAPSULE, LIQUID FILLED ORAL at 08:13

## 2021-06-20 NOTE — PROGRESS NOTES
Section Progress Note    Assessment/Plan     Status post  section: Doing well postoperatively.     Continue current care.    Rh status: O positive  Rubella: Immune  Gender: Female, in NICU. Possible discharge tomorrow.     Subjective     Postpartum Day 2:  Delivery    The patient feels well. The patient denies emotional concerns. Pain is well controlled with current medications. The baby is in NICU, but they report she may be discharged tomorrow. The patient is ambulating well. The patient is tolerating a normal diet. Patient reports flatus.    Objective     Vital signs in last 24 hours:  Temp:  [97.2 °F (36.2 °C)-98.5 °F (36.9 °C)] 98.5 °F (36.9 °C)  Heart Rate:  [77-93] 77  Resp:  [16] 16  BP: (105-116)/(66-77) 112/77      General:    alert, appears stated age and cooperative   CV: Well perfused   Lungs:  no respiratory distress   Bowel Sounds:  active   Lochia:  appropriate   Uterine Fundus:   firm   Incision:  healing well, no significant drainage, no dehiscence, no significant erythema   DVT Evaluation:  No evidence of DVT seen on physical exam.     Lab Results   Component Value Date    WBC 10.06 2021    HGB 10.5 (L) 2021    HCT 31.5 (L) 2021    MCV 88.0 2021     2021         Eleni Taylor MD  2021  10:53 EDT

## 2021-06-20 NOTE — PLAN OF CARE
Goal Outcome Evaluation:  Plan of Care Reviewed With: patient        Progress: improving  Outcome Summary: VSS, pain well controlled with po pain medications, fundus and lochia WNL, voiding spontaneously, ambulate independently, will continue to monitor.

## 2021-06-21 VITALS
OXYGEN SATURATION: 96 % | RESPIRATION RATE: 16 BRPM | WEIGHT: 201.8 LBS | HEART RATE: 109 BPM | SYSTOLIC BLOOD PRESSURE: 108 MMHG | TEMPERATURE: 99.1 F | BODY MASS INDEX: 32.57 KG/M2 | DIASTOLIC BLOOD PRESSURE: 68 MMHG

## 2021-06-21 LAB
LAB AP CASE REPORT: NORMAL
PATH REPORT.FINAL DX SPEC: NORMAL
PATH REPORT.GROSS SPEC: NORMAL

## 2021-06-21 PROCEDURE — 0503F POSTPARTUM CARE VISIT: CPT | Performed by: OBSTETRICS & GYNECOLOGY

## 2021-06-21 RX ORDER — IBUPROFEN 600 MG/1
600 TABLET ORAL EVERY 8 HOURS PRN
Qty: 30 TABLET | Refills: 1 | Status: SHIPPED | OUTPATIENT
Start: 2021-06-21 | End: 2021-07-15

## 2021-06-21 RX ORDER — OXYCODONE HYDROCHLORIDE AND ACETAMINOPHEN 5; 325 MG/1; MG/1
1 TABLET ORAL EVERY 4 HOURS PRN
Qty: 15 TABLET | Refills: 0 | Status: SHIPPED | OUTPATIENT
Start: 2021-06-21 | End: 2021-06-25

## 2021-06-21 RX ADMIN — IBUPROFEN 600 MG: 600 TABLET, FILM COATED ORAL at 05:36

## 2021-06-21 RX ADMIN — OXYCODONE AND ACETAMINOPHEN 1 TABLET: 5; 325 TABLET ORAL at 05:36

## 2021-06-21 NOTE — PROGRESS NOTES
Discharge Planning Assessment  [facility]     Patient Name: Carlyn Cali  MRN: [id number]  Today's Date: 6/21/2021    Admit Date: 6/18/2021    Discharge Needs Assessment    No documentation.       Discharge Plan     Row Name 06/21/21 1623       Plan    Plan  Infant to discharge home with mother when medically ready.. Joselin Reyes, CSW    Plan Comments  Mother: Carlyn Cali, MRN [id number] ; Infant: Felisha “Cat” Viraj, MRN [id number]. CSW was consulted for “nicu admission.” Of note, no urine toxicology obtained on infant or mother. Cord toxicology was not sent, no indication of need for toxicology screens at this time. CSW met with mother at bedside in NICU. Father of infant also at bedside. Mother declined to speak privately with CSW. Mother verified address, phone number and insurance. Mother reports father is adding infant to his insurance and father reports he is aware of process to add infant to insurance. Mother reports they have car seat, bassinette, crib, clothes, diapers, and other supplies. Mother reports she is current with WIC.  Mother plans on infant follow up with [facility]. Mother reports she is comfortable scheduling appointments and will have transportation to appointments. Mother reports a good support system in maternal grandmother, paternal grandmother and father of infant.  Mother denies any other needs or concerns at this time. Parents are hopeful for infant to discharge home in next few days. CSW provided support and encouragement to family. Infant does not appear to qualify for SSI for low birth weight. CSW provided mother with a packet of resources and briefly discussed resources in the packet. Packet includes info on: WIC, HANDS, infant supplies, counseling, post-partum mood and anxiety disorders, online support groups, transportation, domestic violence, and general community resources. Parents were polite and cooperative during discussion. Mother was holding and  bonding with infant during discussion and was appropriately interacting with and caring for child. CSW will follow for psychosocial needs as infant is in the NICU. SHAUN Adam        Continued Care and Services - Admitted Since 6/18/2021    Coordination has not been started for this encounter.       Expected Discharge Date and Time     Expected Discharge Date Expected Discharge Time    Jun 21, 2021         Demographic Summary     Row Name 06/21/21 1622       General Information    Admission Type  inpatient    Arrived From  home    Referral Source  nursing    Reason for Consult  community resources;psychosocial concerns    General Information Comments  nicu admission        Functional Status     Row Name 06/21/21 1623       Mental Status    General Appearance WDL  WDL       Mental Status Summary    Recent Changes in Mental Status/Cognitive Functioning  unable to assess        Psychosocial     Row Name 06/21/21 1623       Behavior WDL    Behavior WDL  WDL       Emotion Mood WDL    Emotion/Mood/Affect WDL  WDL       Speech WDL    Speech WDL  WDL       Perceptual State WDL    Perceptual State WDL  WDL       Thought Process WDL    Thought Process WDL  WDL       Intellectual Performance WDL    Intellectual Performance WDL  WDL       Coping/Stress    Major Change/Loss/Stressor  birth    Patient Personal Strengths  able to adapt;flexibility;future/goal oriented;motivated;positive attitude;resilient;resourceful;strong support system    Sources of Support  parent(s);other family members;significant other        Abuse/Neglect     Row Name 06/21/21 1623       Personal Safety    Physical Signs of Abuse Present  no        Legal    No documentation.       Substance Abuse    No documentation.       Patient Forms    No documentation.           JAMIE Adam

## 2021-06-21 NOTE — DISCHARGE INSTRUCTIONS
Routine  discharge instructions.  No heavy lifting no driving for 1 week.  Appointment with primary OB in 2 weeks for incision check.

## 2021-06-21 NOTE — DISCHARGE SUMMARY
Date of Discharge:  2021    Discharge Diagnosis: Term , desired sterilization    Presenting Problem/History of Present Illness  Active Hospital Problems    Diagnosis  POA   • S/P  section [Z98.891]  Not Applicable   • Unwanted fertility [Z30.09]  Yes   • History of  delivery [Z98.891]  Not Applicable      Resolved Hospital Problems    Diagnosis Date Resolved POA   • Pregnancy [Z34.90] 2021 Not Applicable          Hospital Course  Patient is a 25 y.o. female presented with scheduled  section repeat at 39+ weeks.  She had scheduled section with tubal sterilization and did well.  Her hemoglobin was 10.5 postop.  She was ambulating, voiding, tolerating regular diet.  On postop day 3 she is stable for discharge home.  Her baby did go to the NICU for some pulmonary fluid but was doing well and expected to be discharged home today.  Her incision is intact and shows no sign of infection or separation..      Procedures Performed    Procedure(s):   SECTION REPEAT WITH TUBAL  -------------------       Consults:   Consults     No orders found from 2021 to 2021.          Pertinent Test Results: labs: Postop hemoglobin 10.5    Condition on Discharge: Stable    Vital Signs  Temp:  [97.6 °F (36.4 °C)-99.1 °F (37.3 °C)] 99.1 °F (37.3 °C)  Heart Rate:  [] 109  Resp:  [16] 16  BP: (108-119)/(68-77) 108/68    Physical Exam:   General Appearance: alert, appears stated age and cooperative  Abdomen: normal bowel sounds, no masses, no hepatomegaly, no splenomegaly, soft non-tender, no guarding and no rebound tenderness  Extremities: moves extremities well, no edema, no cyanosis and no redness  Psych: normal    Discharge Disposition  Home or Self Care    Discharge Medications     Discharge Medications      New Medications      Instructions Start Date   ibuprofen 600 MG tablet  Commonly known as: ADVIL,MOTRIN   600 mg, Oral, Every 8 Hours PRN      oxyCODONE-acetaminophen  5-325 MG per tablet  Commonly known as: PERCOCET   1 tablet, Oral, Every 4 Hours PRN         Continue These Medications      Instructions Start Date   IRON PO   Oral      prenatal (CLASSIC) vitamin  tablet  Generic drug: prenatal vitamin   Oral, Daily         Stop These Medications    nitrofurantoin (macrocrystal-monohydrate) 100 MG capsule  Commonly known as: MACROBID            Discharge Diet: Regular  Activity at Discharge: Increasing as tolerated.  No driving for 1 week.    Follow-up Appointments  No future appointments.      Test Results Pending at Discharge  Pending Labs     Order Current Status    Tissue Pathology Exam In process           Lexa James MD  06/21/21  08:43 EDT    Time: 25 minutes reviewing discharge instructions and follow-up plans.

## 2021-06-21 NOTE — PLAN OF CARE
Goal Outcome Evaluation:  Plan of Care Reviewed With: patient, significant other        Progress: improving  Outcome Summary: V/S stable, uterus, bleeding, and incision WNL, pain controlled with PO pain meds, ambulating to NICU. Infant bottlefeeding in NICU. Infant may be D/C'd tomorrow from NICU, if so mom would like to be D/C'd as well.

## 2021-07-15 ENCOUNTER — POSTPARTUM VISIT (OUTPATIENT)
Dept: OBSTETRICS AND GYNECOLOGY | Facility: CLINIC | Age: 26
End: 2021-07-15

## 2021-07-15 VITALS
WEIGHT: 183 LBS | DIASTOLIC BLOOD PRESSURE: 69 MMHG | BODY MASS INDEX: 29.41 KG/M2 | SYSTOLIC BLOOD PRESSURE: 115 MMHG | HEIGHT: 66 IN

## 2021-07-15 DIAGNOSIS — Z98.51 S/P TUBAL LIGATION: ICD-10-CM

## 2021-07-15 DIAGNOSIS — Z98.891 S/P CESAREAN SECTION: ICD-10-CM

## 2021-07-15 PROCEDURE — 0503F POSTPARTUM CARE VISIT: CPT | Performed by: STUDENT IN AN ORGANIZED HEALTH CARE EDUCATION/TRAINING PROGRAM

## 2021-07-15 NOTE — PROGRESS NOTES
"Chief Complaint   Patient presents with   • Postpartum Follow-up     bottle feeding   female 7lb 5oz        SUBJECTIVE:   Carlyn Cali is a 26 y.o.  who presents s/p  Repeat Low Transverse  Section with distal fimbrectomy on 21. She reports doing well. She has normal bladder and bowel function. She reports that her bleeding is very light and not daily. She denies mood changes. She is bottle feeding.     Past Medical History:   Diagnosis Date   • Anemia    • Urinary tract infection     x1 during pregnancy - treated     Past Surgical History:   Procedure Laterality Date   •  SECTION         •  SECTION WITH TUBAL N/A 2021    Procedure:  SECTION REPEAT WITH TUBAL;  Surgeon: Pauly Leyva MD;  Location: [facility]OR DELIVERY;  Service: Obstetrics/Gynecology;  Laterality: N/A;   • TONSILLECTOMY      as child     Social History     Tobacco Use   • Smoking status: Never Smoker   • Smokeless tobacco: Never Used   Substance Use Topics   • Alcohol use: No   • Drug use: No     No family history on file.    Patient Active Problem List   Diagnosis   • Abdominal pain affecting pregnancy, antepartum   • Back pain affecting pregnancy in third trimester   • UTI (urinary tract infection) during pregnancy   • Trichomonas vaginalis infection   • History of  delivery   • Unwanted fertility   • S/P  section        OBJECTIVE:   /69   Ht 167.6 cm (65.98\")   Wt 83 kg (183 lb)   LMP 2020   BMI 29.55 kg/m²    Physical Examination:   General appearance - alert, well appearing, and in no distress  Breasts - Examined in supine position  Symmetric without masses or skin dimpling  Nipples normal without inversion, lesions or discharge  There are no palpable axillary nodes  Chest - no tachypnea, retractions or cyanosis  Heart - normal rate and regular rhythm  Abdomen - soft, nontender, nondistended, no masses or organomegaly; Incision healing well, no drainage, " no erythema, no hernia, no seroma  Pelvic - normal external female genitalia, normal  Vulva, normal appearing vaginal mucosa, normal appearing cervix. Uterus non-tender, normal sized. No adnexal masses or tenderness.   Neurological - screening mental status exam normal  Musculoskeletal - no joint tenderness, deformity or swelling  Psychiatric - normal mood and affect    Lab Results   Component Value Date    WBC 10.06 2021    HGB 10.5 (L) 2021    HCT 31.5 (L) 2021    MCV 88.0 2021     2021        ASSESSMENT:   S/p repeat low transverse  section with tubal ligation via distal fimbriectomy     PLAN:   Doing well postpartum  Baby is doing well  Contraception- s/p tubal ligation   At this time, continue pelvic rest and lifting precautions until 6 weeks postpartum.   Reviewed last pap smear - 2021- NILM   Return in about 1 year (around 7/15/2022) for Annual physical.    Pauly Leyva MD

## (undated) DEVICE — SUT GUT PLN 0 STD TIE 54IN S104H

## (undated) DEVICE — SOL IRR H2O BTL 1000ML STRL

## (undated) DEVICE — SUT MNCRYL PLS ANTIB UD 4/0 PS2 18IN

## (undated) DEVICE — SUT MNCRYL 0/0 CTX 36IN Y398H

## (undated) DEVICE — 3M(TM) TEGADERM(TM) TRANSPARENT FILM DRESSING FRAME STYLE 1627: Brand: 3M™ TEGADERM™

## (undated) DEVICE — GLV SURG SENSICARE MICRO PF LF 6 STRL

## (undated) DEVICE — KENDALL SCD EXPRESS SLEEVES, KNEE LENGTH, MEDIUM: Brand: KENDALL SCD

## (undated) DEVICE — SUT VIC 0 CT 36IN J958H

## (undated) DEVICE — ANTIBACTERIAL UNDYED BRAIDED (POLYGLACTIN 910), SYNTHETIC ABSORBABLE SUTURE: Brand: COATED VICRYL

## (undated) DEVICE — GLV SURG SENSICARE MICRO PF LF 6.5 STRL